# Patient Record
Sex: FEMALE | Race: WHITE | Employment: OTHER | URBAN - METROPOLITAN AREA
[De-identification: names, ages, dates, MRNs, and addresses within clinical notes are randomized per-mention and may not be internally consistent; named-entity substitution may affect disease eponyms.]

---

## 2017-01-03 ENCOUNTER — ANTI-COAG VISIT (OUTPATIENT)
Dept: PHARMACY | Age: 82
End: 2017-01-03

## 2017-01-03 DIAGNOSIS — I48.20 CHRONIC ATRIAL FIBRILLATION (HCC): Primary | ICD-10-CM

## 2017-01-03 LAB
INR BLD: 2.9
PROTIME: 34.2 SECONDS

## 2017-02-01 ENCOUNTER — HOSPITAL ENCOUNTER (OUTPATIENT)
Dept: OTHER | Age: 82
Discharge: HOME | End: 2017-02-28
Attending: INTERNAL MEDICINE | Admitting: INTERNAL MEDICINE

## 2017-02-14 ENCOUNTER — ANTI-COAG VISIT (OUTPATIENT)
Dept: PHARMACY | Age: 82
End: 2017-02-14

## 2017-02-14 DIAGNOSIS — I48.20 CHRONIC ATRIAL FIBRILLATION (HCC): ICD-10-CM

## 2017-02-14 LAB
INR BLD: 2.5
PROTIME: 30.2 SECONDS

## 2017-02-21 ENCOUNTER — HOSPITAL ENCOUNTER (OUTPATIENT)
Dept: OTHER | Age: 82
Discharge: HOME | End: 2017-02-21
Attending: INTERNAL MEDICINE | Admitting: INTERNAL MEDICINE

## 2017-02-21 DIAGNOSIS — I25.10 ATHEROSCLEROSIS OF NATIVE CORONARY ARTERY OF NATIVE HEART WITHOUT ANGINA PECTORIS: Chronic | ICD-10-CM

## 2017-02-21 DIAGNOSIS — I10 ESSENTIAL HYPERTENSION, BENIGN: Chronic | ICD-10-CM

## 2017-02-21 DIAGNOSIS — E78.2 MIXED HYPERLIPIDEMIA: Primary | Chronic | ICD-10-CM

## 2017-02-21 DIAGNOSIS — Z79.899 ENCOUNTER FOR LONG-TERM (CURRENT) USE OF MEDICATIONS: ICD-10-CM

## 2017-02-21 LAB
ALT SERPL-CCNC: 13 U/L (ref 10–40)
ANION GAP SERPL CALCULATED.3IONS-SCNC: 13 MMOL/L (ref 3–16)
AST SERPL-CCNC: 21 U/L (ref 15–37)
BUN BLDV-MCNC: 22 MG/DL (ref 7–20)
CALCIUM SERPL-MCNC: 9.3 MG/DL (ref 8.3–10.6)
CHLORIDE BLD-SCNC: 95 MMOL/L (ref 99–110)
CHOLESTEROL, TOTAL: 144 MG/DL (ref 0–199)
CO2: 28 MMOL/L (ref 21–32)
CREAT SERPL-MCNC: 0.7 MG/DL (ref 0.6–1.2)
GFR AFRICAN AMERICAN: >60
GFR NON-AFRICAN AMERICAN: >60
GLUCOSE BLD-MCNC: 98 MG/DL (ref 70–99)
HDLC SERPL-MCNC: 78 MG/DL (ref 40–60)
LDL CHOLESTEROL CALCULATED: 54 MG/DL
POTASSIUM SERPL-SCNC: 3.6 MMOL/L (ref 3.5–5.1)
SODIUM BLD-SCNC: 136 MMOL/L (ref 136–145)
TRIGL SERPL-MCNC: 61 MG/DL (ref 0–150)
VLDLC SERPL CALC-MCNC: 12 MG/DL

## 2017-02-22 ENCOUNTER — TELEPHONE (OUTPATIENT)
Dept: CARDIOLOGY CLINIC | Age: 82
End: 2017-02-22

## 2017-03-28 ENCOUNTER — ANTI-COAG VISIT (OUTPATIENT)
Dept: PHARMACY | Age: 82
End: 2017-03-28

## 2017-03-28 DIAGNOSIS — I48.20 CHRONIC ATRIAL FIBRILLATION (HCC): ICD-10-CM

## 2017-03-28 LAB
INR BLD: 2.7
PROTIME: 32.1 SECONDS

## 2017-05-09 ENCOUNTER — ANTI-COAG VISIT (OUTPATIENT)
Dept: PHARMACY | Age: 82
End: 2017-05-09

## 2017-05-09 DIAGNOSIS — I48.20 CHRONIC ATRIAL FIBRILLATION (HCC): ICD-10-CM

## 2017-05-09 LAB
INR BLD: 3
PROTIME: 35.7 SECONDS

## 2017-06-20 ENCOUNTER — ANTI-COAG VISIT (OUTPATIENT)
Dept: PHARMACY | Age: 82
End: 2017-06-20

## 2017-06-20 DIAGNOSIS — I48.20 CHRONIC ATRIAL FIBRILLATION (HCC): ICD-10-CM

## 2017-06-20 LAB — INR BLD: 2.4

## 2017-07-19 ENCOUNTER — TELEPHONE (OUTPATIENT)
Dept: PHARMACY | Age: 82
End: 2017-07-19

## 2017-08-01 ENCOUNTER — ANTI-COAG VISIT (OUTPATIENT)
Dept: PHARMACY | Age: 82
End: 2017-08-01

## 2017-08-01 DIAGNOSIS — I48.20 CHRONIC ATRIAL FIBRILLATION (HCC): ICD-10-CM

## 2017-08-01 LAB
INR BLD: 2.8
PROTIME: 34 SECONDS

## 2017-08-24 ENCOUNTER — HOSPITAL ENCOUNTER (OUTPATIENT)
Dept: OTHER | Age: 82
Discharge: HOME | End: 2017-08-24
Attending: INTERNAL MEDICINE | Admitting: INTERNAL MEDICINE

## 2017-08-24 DIAGNOSIS — Z79.899 ENCOUNTER FOR LONG-TERM (CURRENT) USE OF OTHER MEDICATIONS: ICD-10-CM

## 2017-08-24 DIAGNOSIS — E78.2 MIXED HYPERLIPIDEMIA: Primary | Chronic | ICD-10-CM

## 2017-08-24 DIAGNOSIS — I25.10 ATHEROSCLEROSIS OF NATIVE CORONARY ARTERY OF NATIVE HEART WITHOUT ANGINA PECTORIS: Chronic | ICD-10-CM

## 2017-08-24 LAB
ALT SERPL-CCNC: 14 U/L (ref 10–40)
ANION GAP SERPL CALCULATED.3IONS-SCNC: 13 MMOL/L (ref 3–16)
AST SERPL-CCNC: 22 U/L (ref 15–37)
BUN BLDV-MCNC: 21 MG/DL (ref 7–20)
CALCIUM SERPL-MCNC: 9.7 MG/DL (ref 8.3–10.6)
CHLORIDE BLD-SCNC: 95 MMOL/L (ref 99–110)
CHOLESTEROL, TOTAL: 158 MG/DL (ref 0–199)
CO2: 29 MMOL/L (ref 21–32)
CREAT SERPL-MCNC: 0.7 MG/DL (ref 0.6–1.2)
GFR AFRICAN AMERICAN: >60
GFR NON-AFRICAN AMERICAN: >60
GLUCOSE BLD-MCNC: 94 MG/DL (ref 70–99)
HDLC SERPL-MCNC: 81 MG/DL (ref 40–60)
LDL CHOLESTEROL CALCULATED: 66 MG/DL
POTASSIUM SERPL-SCNC: 3.7 MMOL/L (ref 3.5–5.1)
SODIUM BLD-SCNC: 137 MMOL/L (ref 136–145)
TRIGL SERPL-MCNC: 53 MG/DL (ref 0–150)
VLDLC SERPL CALC-MCNC: 11 MG/DL

## 2017-08-25 ENCOUNTER — TELEPHONE (OUTPATIENT)
Dept: CARDIOLOGY CLINIC | Age: 82
End: 2017-08-25

## 2017-09-12 ENCOUNTER — ANTI-COAG VISIT (OUTPATIENT)
Dept: PHARMACY | Age: 82
End: 2017-09-12

## 2017-09-12 DIAGNOSIS — I48.20 CHRONIC ATRIAL FIBRILLATION (HCC): ICD-10-CM

## 2017-09-12 LAB
INR BLD: 3
PROTIME: 35.6 SECONDS

## 2017-10-24 ENCOUNTER — ANTI-COAG VISIT (OUTPATIENT)
Dept: PHARMACY | Age: 82
End: 2017-10-24

## 2017-10-24 DIAGNOSIS — I48.20 CHRONIC ATRIAL FIBRILLATION (HCC): ICD-10-CM

## 2017-10-24 LAB
INR BLD: 2.8
PROTIME: 33.2 SECONDS

## 2017-11-01 ENCOUNTER — TELEPHONE (OUTPATIENT)
Dept: PHARMACY | Age: 82
End: 2017-11-01

## 2017-11-01 ENCOUNTER — HOSPITAL ENCOUNTER (OUTPATIENT)
Dept: OTHER | Age: 82
Discharge: HOME | End: 2017-11-30
Attending: INTERNAL MEDICINE | Admitting: INTERNAL MEDICINE

## 2017-12-01 ENCOUNTER — HOSPITAL ENCOUNTER (OUTPATIENT)
Dept: OTHER | Age: 82
Discharge: HOME | End: 2017-12-31
Attending: INTERNAL MEDICINE | Admitting: INTERNAL MEDICINE

## 2017-12-04 ENCOUNTER — OFFICE VISIT (OUTPATIENT)
Dept: CARDIOLOGY CLINIC | Age: 82
End: 2017-12-04

## 2017-12-04 VITALS
DIASTOLIC BLOOD PRESSURE: 62 MMHG | SYSTOLIC BLOOD PRESSURE: 122 MMHG | BODY MASS INDEX: 23.52 KG/M2 | OXYGEN SATURATION: 97 % | HEART RATE: 63 BPM | WEIGHT: 116.7 LBS | HEIGHT: 59 IN

## 2017-12-04 DIAGNOSIS — I10 ESSENTIAL HYPERTENSION, BENIGN: Chronic | ICD-10-CM

## 2017-12-04 DIAGNOSIS — I48.20 CHRONIC ATRIAL FIBRILLATION (HCC): Chronic | ICD-10-CM

## 2017-12-04 DIAGNOSIS — E78.2 MIXED HYPERLIPIDEMIA: Primary | Chronic | ICD-10-CM

## 2017-12-04 DIAGNOSIS — I25.10 ATHEROSCLEROSIS OF NATIVE CORONARY ARTERY OF NATIVE HEART WITHOUT ANGINA PECTORIS: Chronic | ICD-10-CM

## 2017-12-04 PROCEDURE — G8427 DOCREV CUR MEDS BY ELIG CLIN: HCPCS | Performed by: INTERNAL MEDICINE

## 2017-12-04 PROCEDURE — 1090F PRES/ABSN URINE INCON ASSESS: CPT | Performed by: INTERNAL MEDICINE

## 2017-12-04 PROCEDURE — 99214 OFFICE O/P EST MOD 30 MIN: CPT | Performed by: INTERNAL MEDICINE

## 2017-12-04 PROCEDURE — 4040F PNEUMOC VAC/ADMIN/RCVD: CPT | Performed by: INTERNAL MEDICINE

## 2017-12-04 PROCEDURE — G8598 ASA/ANTIPLAT THER USED: HCPCS | Performed by: INTERNAL MEDICINE

## 2017-12-04 PROCEDURE — G8420 CALC BMI NORM PARAMETERS: HCPCS | Performed by: INTERNAL MEDICINE

## 2017-12-04 PROCEDURE — 1123F ACP DISCUSS/DSCN MKR DOCD: CPT | Performed by: INTERNAL MEDICINE

## 2017-12-04 PROCEDURE — 1036F TOBACCO NON-USER: CPT | Performed by: INTERNAL MEDICINE

## 2017-12-04 PROCEDURE — G8484 FLU IMMUNIZE NO ADMIN: HCPCS | Performed by: INTERNAL MEDICINE

## 2017-12-04 RX ORDER — AMLODIPINE BESYLATE 5 MG/1
5 TABLET ORAL DAILY
Qty: 90 TABLET | Refills: 3 | Status: SHIPPED | OUTPATIENT
Start: 2017-12-04 | End: 2018-11-27 | Stop reason: SDUPTHER

## 2017-12-04 RX ORDER — PRAVASTATIN SODIUM 40 MG
40 TABLET ORAL DAILY
Qty: 90 TABLET | Refills: 3 | Status: SHIPPED | OUTPATIENT
Start: 2017-12-04 | End: 2018-11-27 | Stop reason: SDUPTHER

## 2017-12-04 RX ORDER — HYDROCHLOROTHIAZIDE 25 MG/1
TABLET ORAL
Qty: 90 TABLET | Refills: 3 | Status: SHIPPED | OUTPATIENT
Start: 2017-12-04 | End: 2018-11-27 | Stop reason: SDUPTHER

## 2017-12-04 RX ORDER — PINDOLOL 10 MG/1
10 TABLET ORAL 2 TIMES DAILY
Qty: 180 TABLET | Refills: 3 | Status: SHIPPED | OUTPATIENT
Start: 2017-12-04 | End: 2018-11-27 | Stop reason: SDUPTHER

## 2017-12-04 RX ORDER — ENALAPRIL MALEATE 5 MG/1
5 TABLET ORAL 2 TIMES DAILY
Qty: 180 TABLET | Refills: 3 | Status: SHIPPED | OUTPATIENT
Start: 2017-12-04 | End: 2018-11-27 | Stop reason: SDUPTHER

## 2017-12-04 NOTE — COMMUNICATION BODY
[address]  Cardiac Follow Up     Referring Provider:  Jeronimo Kovacs MD     Chief Complaint   Patient presents with    Hyperlipidemia     No new cardiac [id number] at this time    Hypertension    Atrial Fibrillation    1 Year Follow Up        History of Present Illness:  Ms. Jacqui Quinn is here today for regular follow up of her AF, CAD, HTN, hyperlipidemia. 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 She recently turned 80. She went went to [facility] for her birthday. She won $100. She is doing well, but she is under stress due to her brother. Past Medical History:   has a past medical history of Arthritis; Carpal tunnel syndrome; Cataract; Gastric ulcer; GERD (gastroesophageal reflux disease); Hyperlipidemia; Hypertension; and Peptic ulcer. Surgical History:   has a past surgical history that includes Hysterectomy; Cataract removal; and laminectomy. Social History:   reports that she quit smoking about 32 years ago. She has never used smokeless tobacco. She reports that she drinks alcohol. She reports that she does not use drugs. Family History:  family history includes Other in her brother and sister; Stroke in her father. Home Medications:  Outpatient Encounter Prescriptions as of 12/4/2017   Medication Sig Dispense Refill    pravastatin (PRAVACHOL) 40 MG tablet Take 1 tablet by mouth daily 90 tablet 3    pindolol (VISKEN) 10 MG tablet Take 1 tablet by mouth 2 times daily 180 tablet 3    enalapril (VASOTEC) 5 MG tablet Take 1 tablet by mouth 2 times daily 180 tablet 3    amLODIPine (NORVASC) 5 MG tablet Take 1 tablet by mouth daily 90 tablet 3    hydrochlorothiazide (HYDRODIURIL) 25 MG tablet TAKE 1 TABLET BY MOUTH DAILY. 90 tablet 3    acetaminophen (TYLENOL ARTHRITIS PAIN) 650 MG CR tablet Take 650 mg by mouth every 8 hours as needed for Pain      warfarin (COUMADIN) 3 MG tablet Take 3 mg by mouth See Admin Instructions.  Dose adjusted by [facility]     

## 2017-12-04 NOTE — PROGRESS NOTES
[address]  Cardiac Follow Up     Referring Provider:  Jane Ch MD     Chief Complaint   Patient presents with    Hyperlipidemia     No new cardiac [id number] at this time    Hypertension    Atrial Fibrillation    1 Year Follow Up        History of Present Illness:  Ms. Jony Milton is here today for regular follow up of her AF, CAD, HTN, hyperlipidemia. 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 She recently turned 80. She went went to [facility] for her birthday. She won $100. She is doing well, but she is under stress due to her brother. Past Medical History:   has a past medical history of Arthritis; Carpal tunnel syndrome; Cataract; Gastric ulcer; GERD (gastroesophageal reflux disease); Hyperlipidemia; Hypertension; and Peptic ulcer. Surgical History:   has a past surgical history that includes Hysterectomy; Cataract removal; and laminectomy. Social History:   reports that she quit smoking about 32 years ago. She has never used smokeless tobacco. She reports that she drinks alcohol. She reports that she does not use drugs. Family History:  family history includes Other in her brother and sister; Stroke in her father. Home Medications:  Outpatient Encounter Prescriptions as of 12/4/2017   Medication Sig Dispense Refill    pravastatin (PRAVACHOL) 40 MG tablet Take 1 tablet by mouth daily 90 tablet 3    pindolol (VISKEN) 10 MG tablet Take 1 tablet by mouth 2 times daily 180 tablet 3    enalapril (VASOTEC) 5 MG tablet Take 1 tablet by mouth 2 times daily 180 tablet 3    amLODIPine (NORVASC) 5 MG tablet Take 1 tablet by mouth daily 90 tablet 3    hydrochlorothiazide (HYDRODIURIL) 25 MG tablet TAKE 1 TABLET BY MOUTH DAILY. 90 tablet 3    acetaminophen (TYLENOL ARTHRITIS PAIN) 650 MG CR tablet Take 650 mg by mouth every 8 hours as needed for Pain      warfarin (COUMADIN) 3 MG tablet Take 3 mg by mouth See Admin Instructions.  Dose adjusted by [facility]      rabeprazole (ACIPHEX) 20 MG tablet Take 20 mg by mouth daily. No facility-administered encounter medications on file as of 12/4/2017. Allergies:  Review of patient's allergies indicates no known allergies. [x] Medications and dosages reviewed. ROS:  [x]Full ROS obtained and negative except as mentioned in HPI    Physical Examination:    Vitals:    12/04/17 0915   BP: 122/62   Pulse: 63   SpO2: 97%        · GENERAL: Well developed, well nourished, No acute distress  · NEUROLOGICAL: Alert and oriented  · PSYCH: Calm affect  · SKIN: Warm and dry  · HEENT: Normocephalic, Sclera non-icteric, mucus membranes moist  · NECK: supple, JVP normal  · CAROTID: Normal upstroke, no bruits  · CARDIAC: Normal PMI, irregular rhythm, normal S1S2, no murmur, rub, or gallop  · RESPIRATORY: Normal respiratory effort,   · EXTREMITIES: No edema  · MUSCULOSKELETAL: No joint swelling or tenderness, no chest wall tenderness  · GASTROINTESTINAL: normal bowel sounds, soft, non-tender, no bruit      Assessment:     Mixed hyperlipidemia   Controlled,  LDL 66, on Pravastatin. Essential hypertension, benign  /62 (Site: Left Arm, Position: Sitting, Cuff Size: Medium Adult)   Pulse 63   Ht 4' 11\" (1.499 m)   Wt 116 lb 11.2 oz (52.9 kg)   SpO2 97%   BMI 23.57 kg/m²   Controlled    Atrial fibrillation  Stable,  HR controlled, on Coumadin (will continue), managed per [facility] clinic    Coronary atherosclerosis of native coronary artery  Cath 2000> 50% LAD  January 2011 GXT Myoview> stable, normal EF  Asymptomatic.  Same meds as above      Plan:  Refill meds  F/u 1 year    Thank you for allowing me to participate in the care of this individual.      Brianne Mcbride M.D., [facility] [place]

## 2017-12-04 NOTE — LETTER
[address] [address] [address] [address]  Phone: [phone]  Fax: [phone]    Jason Arriaga MD        December 4, 2017     Nate Linder, [address] [facility] [address]    Patient: Mario Kumar  MR Number: [id number]  YOB: 1927  Date of Visit: 12/4/2017    Dear Dr. Jenny Sesay  Cardiac Follow Up     Referring Provider:  Nate Linder MD     Chief Complaint   Patient presents with    Hyperlipidemia     No new cardiac [id number] at this time    Hypertension    Atrial Fibrillation    1 Year Follow Up        History of Present Illness:  Ms. Anmol Kelly is here today for regular follow up of her AF, CAD, HTN, hyperlipidemia. 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 She recently turned 80. She went went to [facility]. She won $100. She is doing well, but she is under stress due to her brother. Past Medical History:   has a past medical history of Arthritis; Carpal tunnel syndrome; Cataract; Gastric ulcer; GERD (gastroesophageal reflux disease); Hyperlipidemia; Hypertension; and Peptic ulcer. Surgical History:   has a past surgical history that includes Hysterectomy; Cataract removal; and laminectomy. Social History:   reports that she quit smoking about 32 years ago. She has never used smokeless tobacco. She reports that she drinks alcohol. She reports that she does not use drugs. Family History:  family history includes Other in her brother and sister; Stroke in her father.      Home Medications:  Outpatient Encounter Prescriptions as of 12/4/2017   Medication Sig Dispense Refill    pravastatin (PRAVACHOL) 40 MG tablet Take 1 tablet by mouth daily 90 tablet 3    pindolol (VISKEN) 10 MG tablet Take 1 tablet by mouth 2 times daily 180 tablet 3    enalapril (VASOTEC) 5 MG tablet Take 1 tablet by mouth 2 times daily 180 tablet 3 Plan:  Refill meds  F/u 1 year    Thank you for allowing me to participate in the care of this individual.      Paul Stanley M.D., [facility] [place]         If you have questions, please do not hesitate to call me. I look forward to following Elzbieta along with you.     Sincerely,        Dong Servin MD

## 2017-12-05 ENCOUNTER — ANTI-COAG VISIT (OUTPATIENT)
Dept: PHARMACY | Age: 82
End: 2017-12-05

## 2017-12-05 DIAGNOSIS — I48.20 CHRONIC ATRIAL FIBRILLATION (HCC): ICD-10-CM

## 2017-12-05 LAB
INR BLD: 2.5
PROTIME: 30.6 SECONDS

## 2017-12-08 ENCOUNTER — TELEPHONE (OUTPATIENT)
Dept: PHARMACY | Age: 82
End: 2017-12-08

## 2017-12-08 NOTE — TELEPHONE ENCOUNTER
Returned call and spoke with patient regarding starting cephalexin 500mg x 10 days for a UTI. Informed patient that this ABX will not affect the INR so no need to adjust the warfarin dose.

## 2017-12-26 ENCOUNTER — TELEPHONE (OUTPATIENT)
Dept: PHARMACY | Age: 82
End: 2017-12-26

## 2017-12-26 NOTE — TELEPHONE ENCOUNTER
Returned call and spoke with patient regarding recent onset of N & V since 5 am this morning. Reviewed the process of easing back fluids once N & V has subsided. Then possibly some crackers several hours after symptoms are gone.

## 2018-01-01 ENCOUNTER — HOSPITAL ENCOUNTER (OUTPATIENT)
Dept: OTHER | Age: 83
Discharge: HOME | End: 2018-01-31
Attending: INTERNAL MEDICINE | Admitting: INTERNAL MEDICINE

## 2018-01-23 ENCOUNTER — ANTI-COAG VISIT (OUTPATIENT)
Dept: PHARMACY | Age: 83
End: 2018-01-23

## 2018-01-23 DIAGNOSIS — I48.20 CHRONIC ATRIAL FIBRILLATION (HCC): ICD-10-CM

## 2018-01-23 LAB
INR BLD: 3.7
PROTIME: 44 SECONDS

## 2018-02-01 ENCOUNTER — HOSPITAL ENCOUNTER (OUTPATIENT)
Dept: OTHER | Age: 83
Discharge: HOME | End: 2018-02-28
Attending: INTERNAL MEDICINE | Admitting: INTERNAL MEDICINE

## 2018-02-20 ENCOUNTER — ANTI-COAG VISIT (OUTPATIENT)
Dept: PHARMACY | Age: 83
End: 2018-02-20

## 2018-02-20 DIAGNOSIS — I48.20 CHRONIC ATRIAL FIBRILLATION (HCC): ICD-10-CM

## 2018-02-20 LAB
INR BLD: 2.8
PROTIME: 34.2 SECONDS

## 2018-02-20 NOTE — PROGRESS NOTES
Ms. Alacron Friend is a 80 y.o. y/o female with history of Afib   She presents today for anticoagulation monitoring and adjustment. Pertinent PMH: HTN, HLD, CAD    Patient Reported Findings:  Yes     No  [x]   []       Patient verifies current dosing regimen as listed  []   [x]       S/S bleeding/bruising/swelling/SOB  []   [x]       Blood in urine or stool  []   [x]       Procedures scheduled in the future at this time  []   [x]       Missed Dose  []   [x]       Extra Dose  []   [x]       Change in medications  []   [x]       Change in health/diet/appetite - Had a salad last night. Usually eats salad once per week. Feels she is eating normally. []   [x]       Change in alcohol use  []   [x]       Change in activity  []   [x]       Hospital admission  []   [x]       Emergency department visit  [x]   []       Other complaints - will be calling PCP d/t onset of UTI. Also c/o what appears to be a stye on her eye. Patient continues to be stressed with taking care of her 81 y/o brother who is struggling with dementia/Alzheimer's. Her younger sister has been diagnosed with Alzheimer's as well. Clinical Outcomes:  Yes     No  []   [x]       Major bleeding event  []   [x]       Thromboembolic event    Duration of warfarin Therapy: Indefinitely   INR Range:  2.0-3.0     INR is 2.8 today. Continue same weekly dose of 1.5mg on Fri and 3mg all other days. Asked patient to call clinic when ABX is ordered for the UTI  Encouraged to maintain a consistency of vegetables/salads. Recheck INR in 6 weeks.      Referring cardiologist is Dr. Ruben Parra.  INR (no units)   Date Value   02/20/2018 2.8   01/23/2018 3.7   12/05/2017 2.5   10/24/2017 2.8

## 2018-02-21 ENCOUNTER — TELEPHONE (OUTPATIENT)
Dept: PHARMACY | Age: 83
End: 2018-02-21

## 2018-02-21 NOTE — TELEPHONE ENCOUNTER
----- Message from Tee Carrera sent at 2/20/2018  4:45 PM EST -----  Contact: Patient  Joselitocheng Charlton, please call patient re: ABX, started on 2/20, for UTI. [phone].

## 2018-02-21 NOTE — TELEPHONE ENCOUNTER
Returned call and spoke with patient regarding the start of cephalexin for a UTI. Informed patient that this ABX will not interfere with the INR so she soul continue on the same warfarin dose. She is also using an OTC product for a stye in her eye.

## 2018-02-26 ENCOUNTER — HOSPITAL ENCOUNTER (OUTPATIENT)
Dept: OTHER | Age: 83
Discharge: HOME | End: 2018-02-26
Attending: INTERNAL MEDICINE | Admitting: INTERNAL MEDICINE

## 2018-02-26 DIAGNOSIS — E78.2 MIXED HYPERLIPIDEMIA: Primary | Chronic | ICD-10-CM

## 2018-02-26 DIAGNOSIS — I25.10 ATHEROSCLEROSIS OF NATIVE CORONARY ARTERY OF NATIVE HEART WITHOUT ANGINA PECTORIS: Chronic | ICD-10-CM

## 2018-02-26 DIAGNOSIS — I10 ESSENTIAL HYPERTENSION, BENIGN: Chronic | ICD-10-CM

## 2018-02-26 LAB
ALT SERPL-CCNC: 12 U/L (ref 10–40)
ANION GAP SERPL CALCULATED.3IONS-SCNC: 12 MMOL/L (ref 3–16)
AST SERPL-CCNC: 22 U/L (ref 15–37)
BUN BLDV-MCNC: 22 MG/DL (ref 7–20)
CALCIUM SERPL-MCNC: 9.3 MG/DL (ref 8.3–10.6)
CHLORIDE BLD-SCNC: 97 MMOL/L (ref 99–110)
CO2: 30 MMOL/L (ref 21–32)
CREAT SERPL-MCNC: 0.7 MG/DL (ref 0.6–1.2)
GFR AFRICAN AMERICAN: >60
GFR NON-AFRICAN AMERICAN: >60
GLUCOSE BLD-MCNC: 97 MG/DL (ref 70–99)
POTASSIUM SERPL-SCNC: 3.7 MMOL/L (ref 3.5–5.1)
SODIUM BLD-SCNC: 139 MMOL/L (ref 136–145)

## 2018-02-27 ENCOUNTER — TELEPHONE (OUTPATIENT)
Dept: CARDIOLOGY CLINIC | Age: 83
End: 2018-02-27

## 2018-03-01 ENCOUNTER — HOSPITAL ENCOUNTER (OUTPATIENT)
Dept: OTHER | Age: 83
Discharge: HOME | End: 2018-03-31
Attending: INTERNAL MEDICINE | Admitting: INTERNAL MEDICINE

## 2018-04-01 ENCOUNTER — HOSPITAL ENCOUNTER (OUTPATIENT)
Dept: OTHER | Age: 83
Discharge: HOME | End: 2018-04-30
Attending: INTERNAL MEDICINE | Admitting: INTERNAL MEDICINE

## 2018-04-10 ENCOUNTER — ANTI-COAG VISIT (OUTPATIENT)
Dept: PHARMACY | Age: 83
End: 2018-04-10

## 2018-04-10 DIAGNOSIS — I48.20 CHRONIC ATRIAL FIBRILLATION (HCC): ICD-10-CM

## 2018-04-10 LAB
INR BLD: 2.3
PROTIME: 27.5 SECONDS

## 2018-05-01 ENCOUNTER — HOSPITAL ENCOUNTER (OUTPATIENT)
Dept: OTHER | Age: 83
Discharge: HOME | End: 2018-05-31
Attending: INTERNAL MEDICINE | Admitting: INTERNAL MEDICINE

## 2018-05-22 ENCOUNTER — ANTI-COAG VISIT (OUTPATIENT)
Dept: PHARMACY | Age: 83
End: 2018-05-22

## 2018-05-22 DIAGNOSIS — I48.20 CHRONIC ATRIAL FIBRILLATION (HCC): ICD-10-CM

## 2018-05-22 LAB
INR BLD: 3.4
PROTIME: 41.3 SECONDS

## 2018-06-01 ENCOUNTER — HOSPITAL ENCOUNTER (OUTPATIENT)
Dept: OTHER | Age: 83
Discharge: HOME | End: 2018-06-30
Attending: INTERNAL MEDICINE | Admitting: INTERNAL MEDICINE

## 2018-06-19 ENCOUNTER — ANTI-COAG VISIT (OUTPATIENT)
Dept: PHARMACY | Age: 83
End: 2018-06-19

## 2018-06-19 DIAGNOSIS — I48.20 CHRONIC ATRIAL FIBRILLATION (HCC): ICD-10-CM

## 2018-06-19 LAB
INR BLD: 2.3
PROTIME: 28 SECONDS

## 2018-07-01 ENCOUNTER — HOSPITAL ENCOUNTER (OUTPATIENT)
Dept: OTHER | Age: 83
Discharge: HOME | End: 2018-07-31
Attending: INTERNAL MEDICINE | Admitting: INTERNAL MEDICINE

## 2018-07-31 ENCOUNTER — ANTI-COAG VISIT (OUTPATIENT)
Dept: PHARMACY | Age: 83
End: 2018-07-31

## 2018-07-31 DIAGNOSIS — I48.20 CHRONIC ATRIAL FIBRILLATION (HCC): ICD-10-CM

## 2018-07-31 LAB
INR BLD: 2.2
PROTIME: 26.9 SECONDS

## 2018-08-01 ENCOUNTER — HOSPITAL ENCOUNTER (OUTPATIENT)
Dept: OTHER | Age: 83
Discharge: HOME | End: 2018-08-31
Attending: INTERNAL MEDICINE | Admitting: INTERNAL MEDICINE

## 2018-08-29 ENCOUNTER — TELEPHONE (OUTPATIENT)
Dept: CARDIOLOGY CLINIC | Age: 83
End: 2018-08-29

## 2018-08-29 ENCOUNTER — HOSPITAL ENCOUNTER (OUTPATIENT)
Dept: OTHER | Age: 83
Discharge: HOME | End: 2018-08-29
Attending: INTERNAL MEDICINE | Admitting: INTERNAL MEDICINE

## 2018-08-29 DIAGNOSIS — E78.2 MIXED HYPERLIPIDEMIA: Chronic | ICD-10-CM

## 2018-08-29 DIAGNOSIS — I10 ESSENTIAL HYPERTENSION, BENIGN: Chronic | ICD-10-CM

## 2018-08-29 DIAGNOSIS — I10 ESSENTIAL HYPERTENSION, BENIGN: Primary | Chronic | ICD-10-CM

## 2018-08-29 DIAGNOSIS — I48.20 CHRONIC ATRIAL FIBRILLATION (HCC): Chronic | ICD-10-CM

## 2018-08-29 DIAGNOSIS — I25.10 ATHEROSCLEROSIS OF NATIVE CORONARY ARTERY OF NATIVE HEART WITHOUT ANGINA PECTORIS: Chronic | ICD-10-CM

## 2018-08-29 LAB
ALT SERPL-CCNC: 13 U/L (ref 10–40)
ANION GAP SERPL CALCULATED.3IONS-SCNC: 12 MMOL/L (ref 3–16)
AST SERPL-CCNC: 24 U/L (ref 15–37)
BUN BLDV-MCNC: 19 MG/DL (ref 7–20)
CALCIUM SERPL-MCNC: 9.7 MG/DL (ref 8.3–10.6)
CHLORIDE BLD-SCNC: 95 MMOL/L (ref 99–110)
CHOLESTEROL, TOTAL: 155 MG/DL (ref 0–199)
CO2: 30 MMOL/L (ref 21–32)
CREAT SERPL-MCNC: 0.8 MG/DL (ref 0.6–1.2)
GFR AFRICAN AMERICAN: >60
GFR NON-AFRICAN AMERICAN: >60
GLUCOSE BLD-MCNC: 102 MG/DL (ref 70–99)
HDLC SERPL-MCNC: 82 MG/DL (ref 40–60)
LDL CHOLESTEROL CALCULATED: 63 MG/DL
POTASSIUM SERPL-SCNC: 3.6 MMOL/L (ref 3.5–5.1)
SODIUM BLD-SCNC: 137 MMOL/L (ref 136–145)
TRIGL SERPL-MCNC: 50 MG/DL (ref 0–150)
VLDLC SERPL CALC-MCNC: 10 MG/DL

## 2018-08-30 ENCOUNTER — TELEPHONE (OUTPATIENT)
Dept: CARDIOLOGY CLINIC | Age: 83
End: 2018-08-30

## 2018-09-01 ENCOUNTER — HOSPITAL ENCOUNTER (OUTPATIENT)
Dept: OTHER | Age: 83
Discharge: HOME | End: 2018-09-01
Attending: INTERNAL MEDICINE | Admitting: INTERNAL MEDICINE

## 2018-09-11 ENCOUNTER — ANTI-COAG VISIT (OUTPATIENT)
Dept: PHARMACY | Age: 83
End: 2018-09-11

## 2018-09-11 DIAGNOSIS — I48.20 CHRONIC ATRIAL FIBRILLATION (HCC): ICD-10-CM

## 2018-09-11 LAB
INR BLD: 2.9
PROTIME: 34.5 SECONDS

## 2018-10-23 ENCOUNTER — ANTI-COAG VISIT (OUTPATIENT)
Dept: PHARMACY | Age: 83
End: 2018-10-23
Payer: COMMERCIAL

## 2018-10-23 DIAGNOSIS — I48.20 CHRONIC ATRIAL FIBRILLATION (HCC): ICD-10-CM

## 2018-10-23 LAB — INTERNATIONAL NORMALIZATION RATIO, POC: 2.4

## 2018-10-23 PROCEDURE — 85610 PROTHROMBIN TIME: CPT

## 2018-10-23 PROCEDURE — 99211 OFF/OP EST MAY X REQ PHY/QHP: CPT

## 2018-11-27 ENCOUNTER — OFFICE VISIT (OUTPATIENT)
Dept: CARDIOLOGY CLINIC | Age: 83
End: 2018-11-27
Payer: COMMERCIAL

## 2018-11-27 VITALS
DIASTOLIC BLOOD PRESSURE: 60 MMHG | HEART RATE: 52 BPM | SYSTOLIC BLOOD PRESSURE: 130 MMHG | HEIGHT: 59 IN | BODY MASS INDEX: 21.97 KG/M2 | OXYGEN SATURATION: 97 % | WEIGHT: 109 LBS

## 2018-11-27 DIAGNOSIS — E78.2 MIXED HYPERLIPIDEMIA: Primary | Chronic | ICD-10-CM

## 2018-11-27 DIAGNOSIS — I10 ESSENTIAL HYPERTENSION, BENIGN: Chronic | ICD-10-CM

## 2018-11-27 DIAGNOSIS — I48.20 CHRONIC ATRIAL FIBRILLATION (HCC): Chronic | ICD-10-CM

## 2018-11-27 DIAGNOSIS — I25.10 ATHEROSCLEROSIS OF NATIVE CORONARY ARTERY OF NATIVE HEART WITHOUT ANGINA PECTORIS: Chronic | ICD-10-CM

## 2018-11-27 PROCEDURE — G8598 ASA/ANTIPLAT THER USED: HCPCS | Performed by: INTERNAL MEDICINE

## 2018-11-27 PROCEDURE — 1090F PRES/ABSN URINE INCON ASSESS: CPT | Performed by: INTERNAL MEDICINE

## 2018-11-27 PROCEDURE — G8420 CALC BMI NORM PARAMETERS: HCPCS | Performed by: INTERNAL MEDICINE

## 2018-11-27 PROCEDURE — 1101F PT FALLS ASSESS-DOCD LE1/YR: CPT | Performed by: INTERNAL MEDICINE

## 2018-11-27 PROCEDURE — 1036F TOBACCO NON-USER: CPT | Performed by: INTERNAL MEDICINE

## 2018-11-27 PROCEDURE — 1123F ACP DISCUSS/DSCN MKR DOCD: CPT | Performed by: INTERNAL MEDICINE

## 2018-11-27 PROCEDURE — 99214 OFFICE O/P EST MOD 30 MIN: CPT | Performed by: INTERNAL MEDICINE

## 2018-11-27 PROCEDURE — 4040F PNEUMOC VAC/ADMIN/RCVD: CPT | Performed by: INTERNAL MEDICINE

## 2018-11-27 PROCEDURE — G8427 DOCREV CUR MEDS BY ELIG CLIN: HCPCS | Performed by: INTERNAL MEDICINE

## 2018-11-27 PROCEDURE — G8484 FLU IMMUNIZE NO ADMIN: HCPCS | Performed by: INTERNAL MEDICINE

## 2018-11-27 RX ORDER — ENALAPRIL MALEATE 5 MG/1
5 TABLET ORAL 2 TIMES DAILY
Qty: 180 TABLET | Refills: 3 | Status: SHIPPED | OUTPATIENT
Start: 2018-11-27 | End: 2019-11-26 | Stop reason: SDUPTHER

## 2018-11-27 RX ORDER — PINDOLOL 10 MG/1
10 TABLET ORAL 2 TIMES DAILY
Qty: 180 TABLET | Refills: 3 | Status: SHIPPED | OUTPATIENT
Start: 2018-11-27 | End: 2019-11-26 | Stop reason: SDUPTHER

## 2018-11-27 RX ORDER — PRAVASTATIN SODIUM 40 MG
40 TABLET ORAL DAILY
Qty: 90 TABLET | Refills: 3 | Status: SHIPPED | OUTPATIENT
Start: 2018-11-27 | End: 2019-11-26 | Stop reason: SDUPTHER

## 2018-11-27 RX ORDER — HYDROCHLOROTHIAZIDE 25 MG/1
TABLET ORAL
Qty: 90 TABLET | Refills: 3 | Status: SHIPPED | OUTPATIENT
Start: 2018-11-27 | End: 2018-12-04 | Stop reason: SDUPTHER

## 2018-11-27 RX ORDER — AMLODIPINE BESYLATE 5 MG/1
5 TABLET ORAL DAILY
Qty: 90 TABLET | Refills: 3 | Status: SHIPPED | OUTPATIENT
Start: 2018-11-27 | End: 2019-11-26 | Stop reason: SDUPTHER

## 2018-11-27 NOTE — COMMUNICATION BODY
[address]  Cardiac Follow Up     Referring Provider:  Taina Lewis MD     Chief Complaint   Patient presents with    Hyperlipidemia     yearly check up        History of Present Illness:  Ms. Rebekah Mera is here today for regular follow up of her AF, CAD, HTN, hyperlipidemia. 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 She continues to do well. No chest pain or dyspnea. No bleeding issues. Still cares for her brother. Past Medical History:   has a past medical history of Arthritis; Carpal tunnel syndrome; Cataract; Gastric ulcer; GERD (gastroesophageal reflux disease); Hyperlipidemia; Hypertension; and Peptic ulcer. Surgical History:   has a past surgical history that includes Hysterectomy; Cataract removal; and laminectomy. Social History:   reports that she quit smoking about 33 years ago. She has never used smokeless tobacco. She reports that she drinks alcohol. She reports that she does not use drugs. Family History:  family history includes Other in her brother and sister; Stroke in her father. Home Medications:  Outpatient Encounter Prescriptions as of 11/27/2018   Medication Sig Dispense Refill    pravastatin (PRAVACHOL) 40 MG tablet Take 1 tablet by mouth daily 90 tablet 3    pindolol (VISKEN) 10 MG tablet Take 1 tablet by mouth 2 times daily 180 tablet 3    enalapril (VASOTEC) 5 MG tablet Take 1 tablet by mouth 2 times daily 180 tablet 3    amLODIPine (NORVASC) 5 MG tablet Take 1 tablet by mouth daily 90 tablet 3    hydrochlorothiazide (HYDRODIURIL) 25 MG tablet TAKE 1 TABLET BY MOUTH DAILY. 90 tablet 3    acetaminophen (TYLENOL ARTHRITIS PAIN) 650 MG CR tablet Take 650 mg by mouth every 8 hours as needed for Pain      warfarin (COUMADIN) 3 MG tablet Take 3 mg by mouth See Admin Instructions. Dose adjusted by [facility]      rabeprazole (ACIPHEX) 20 MG tablet Take 20 mg by mouth daily.          No facility-administered encounter medications on file as of 11/27/2018. Allergies:  Patient has no known allergies. [x] Medications and dosages reviewed. ROS:  [x]Full ROS obtained and negative except as mentioned in HPI    Physical Examination:    Vitals:    11/27/18 0818   BP: 130/60   Pulse: 52   SpO2: 97%        · GENERAL: Well developed, well nourished, No acute distress  · NEUROLOGICAL: Alert and oriented  · PSYCH: Calm affect  · SKIN: Warm and dry, no rash  · HEENT: Normocephalic, Sclera non-icteric, mucus membranes moist  · NECK: supple, JVP normal  · CAROTID: Normal upstroke, no bruits  · CARDIAC: Normal PMI, irregular rhythm, normal S1S2, no murmur, rub, or gallop  · RESPIRATORY: Normal respiratory effort,   · EXTREMITIES: No LE edema  · MUSCULOSKELETAL: No joint swelling or tenderness, no chest wall tenderness  · GASTROINTESTINAL: normal bowel sounds, soft, non-tender, no bruit      Assessment:     Mixed hyperlipidemia   Controlled,  LDL 63, on Pravastatin. Essential hypertension, benign  /60 (Site: Right Upper Arm, Position: Sitting, Cuff Size: Medium Adult)   Pulse 52   Ht 4' 11\" (1.499 m)   Wt 109 lb (49.4 kg)   SpO2 97%   BMI 22.02 kg/m²    Well controlled. Same meds    Atrial fibrillation  Doing well. HR controlled, on Coumadin (will continue), managed per [facility] clinic    Coronary atherosclerosis of native coronary artery  Cath 2000> 50% LAD  January 2011 GXT Myoview> stable, normal EF  Asymptomatic. Continue meds as above. Plan:  Stable   Refill meds  F/u 1 yr.     Thank you for allowing me to participate in the care of this individual.      Manyn Suazo M.D., [facility] [place]

## 2018-11-27 NOTE — PROGRESS NOTES
11/27/2018. Allergies:  Patient has no known allergies. [x] Medications and dosages reviewed. ROS:  [x]Full ROS obtained and negative except as mentioned in HPI    Physical Examination:    Vitals:    11/27/18 0818   BP: 130/60   Pulse: 52   SpO2: 97%        · GENERAL: Well developed, well nourished, No acute distress  · NEUROLOGICAL: Alert and oriented  · PSYCH: Calm affect  · SKIN: Warm and dry, no rash  · HEENT: Normocephalic, Sclera non-icteric, mucus membranes moist  · NECK: supple, JVP normal  · CAROTID: Normal upstroke, no bruits  · CARDIAC: Normal PMI, irregular rhythm, normal S1S2, no murmur, rub, or gallop  · RESPIRATORY: Normal respiratory effort,   · EXTREMITIES: No LE edema  · MUSCULOSKELETAL: No joint swelling or tenderness, no chest wall tenderness  · GASTROINTESTINAL: normal bowel sounds, soft, non-tender, no bruit      Assessment:     Mixed hyperlipidemia   Controlled,  LDL 63, on Pravastatin. Essential hypertension, benign  /60 (Site: Right Upper Arm, Position: Sitting, Cuff Size: Medium Adult)   Pulse 52   Ht 4' 11\" (1.499 m)   Wt 109 lb (49.4 kg)   SpO2 97%   BMI 22.02 kg/m²   Well controlled. Same meds    Atrial fibrillation  Doing well. HR controlled, on Coumadin (will continue), managed per [facility] clinic    Coronary atherosclerosis of native coronary artery  Cath 2000> 50% LAD  January 2011 GXT Myoview> stable, normal EF  Asymptomatic. Continue meds as above. Plan:  Stable   Refill meds  F/u 1 yr.     Thank you for allowing me to participate in the care of this individual.      Bell Coombs M.D., [facility]

## 2018-12-03 ENCOUNTER — TELEPHONE (OUTPATIENT)
Dept: CARDIOLOGY CLINIC | Age: 83
End: 2018-12-03

## 2018-12-03 DIAGNOSIS — I10 ESSENTIAL HYPERTENSION, BENIGN: Chronic | ICD-10-CM

## 2018-12-03 NOTE — TELEPHONE ENCOUNTER
She is on amlodipine and HCTZ and heard they cause cancer . Should she continue taking both these ? She knows mmk is oot and will wait for an answer until tomorrow . She is having phone problems so she will call tomorrow at 1pm for answer .

## 2018-12-04 ENCOUNTER — ANTI-COAG VISIT (OUTPATIENT)
Dept: PHARMACY | Age: 83
End: 2018-12-04
Payer: COMMERCIAL

## 2018-12-04 DIAGNOSIS — I48.20 CHRONIC ATRIAL FIBRILLATION (HCC): ICD-10-CM

## 2018-12-04 LAB — INTERNATIONAL NORMALIZATION RATIO, POC: 2.4

## 2018-12-04 PROCEDURE — 99211 OFF/OP EST MAY X REQ PHY/QHP: CPT

## 2018-12-04 PROCEDURE — 85610 PROTHROMBIN TIME: CPT

## 2018-12-04 RX ORDER — HYDROCHLOROTHIAZIDE 25 MG/1
TABLET ORAL
Qty: 90 TABLET | Refills: 3 | Status: SHIPPED | OUTPATIENT
Start: 2018-12-04 | End: 2018-12-05 | Stop reason: SDUPTHER

## 2018-12-04 NOTE — TELEPHONE ENCOUNTER
Tried calling patient. No answer and no availability to leave a message. E-scribed HCTZ to [facility].    Please try calling patient again tomorrow (12/5)

## 2018-12-05 RX ORDER — HYDROCHLOROTHIAZIDE 25 MG/1
TABLET ORAL
Qty: 90 TABLET | Refills: 3 | Status: SHIPPED | OUTPATIENT
Start: 2018-12-05 | End: 2019-11-26 | Stop reason: SDUPTHER

## 2019-01-15 ENCOUNTER — ANTI-COAG VISIT (OUTPATIENT)
Dept: PHARMACY | Age: 84
End: 2019-01-15
Payer: COMMERCIAL

## 2019-01-15 DIAGNOSIS — I48.20 CHRONIC ATRIAL FIBRILLATION (HCC): ICD-10-CM

## 2019-01-15 LAB — INTERNATIONAL NORMALIZATION RATIO, POC: 2.6

## 2019-01-15 PROCEDURE — 85610 PROTHROMBIN TIME: CPT

## 2019-01-15 PROCEDURE — 99211 OFF/OP EST MAY X REQ PHY/QHP: CPT

## 2019-02-26 ENCOUNTER — ANTI-COAG VISIT (OUTPATIENT)
Dept: PHARMACY | Age: 84
End: 2019-02-26
Payer: COMMERCIAL

## 2019-02-26 DIAGNOSIS — I48.20 CHRONIC ATRIAL FIBRILLATION (HCC): ICD-10-CM

## 2019-02-26 LAB — INTERNATIONAL NORMALIZATION RATIO, POC: 2.5

## 2019-02-26 PROCEDURE — 99211 OFF/OP EST MAY X REQ PHY/QHP: CPT

## 2019-02-26 PROCEDURE — 85610 PROTHROMBIN TIME: CPT

## 2019-04-03 ENCOUNTER — TELEPHONE (OUTPATIENT)
Dept: PHARMACY | Age: 84
End: 2019-04-03

## 2019-04-03 RX ORDER — PANTOPRAZOLE SODIUM 40 MG/1
40 TABLET, DELAYED RELEASE ORAL DAILY
COMMUNITY

## 2019-04-03 NOTE — TELEPHONE ENCOUNTER
Returned call and spoke with patient regarding a change in her medication from Aciphex to Pantoprazole. Informed patient that it will not interfere with the warfarin.

## 2019-04-03 NOTE — TELEPHONE ENCOUNTER
----- Message from Sly Negron sent at 4/3/2019 10:01 AM EDT -----  Contact: Sadie Lovett pt left v/m asking if you could call her back .  [phone]

## 2019-04-09 ENCOUNTER — ANTI-COAG VISIT (OUTPATIENT)
Dept: PHARMACY | Age: 84
End: 2019-04-09
Payer: COMMERCIAL

## 2019-04-09 DIAGNOSIS — I48.20 CHRONIC ATRIAL FIBRILLATION (HCC): ICD-10-CM

## 2019-04-09 LAB — INTERNATIONAL NORMALIZATION RATIO, POC: 2.5

## 2019-04-09 PROCEDURE — 85610 PROTHROMBIN TIME: CPT

## 2019-04-09 PROCEDURE — 99211 OFF/OP EST MAY X REQ PHY/QHP: CPT

## 2019-05-21 ENCOUNTER — ANTI-COAG VISIT (OUTPATIENT)
Dept: PHARMACY | Age: 84
End: 2019-05-21
Payer: COMMERCIAL

## 2019-05-21 DIAGNOSIS — I48.20 CHRONIC ATRIAL FIBRILLATION (HCC): ICD-10-CM

## 2019-05-21 LAB — INTERNATIONAL NORMALIZATION RATIO, POC: 2.5

## 2019-05-21 PROCEDURE — 99211 OFF/OP EST MAY X REQ PHY/QHP: CPT

## 2019-05-21 PROCEDURE — 85610 PROTHROMBIN TIME: CPT

## 2019-05-21 NOTE — PROGRESS NOTES
Ms. Moody Oates is a 80 y.o. y/o female with history of Afib   She presents today for anticoagulation monitoring and adjustment. Pertinent PMH: HTN, HLD, CAD    Patient Reported Findings:  Yes     No  [x]   []       Patient verifies current dosing regimen as listed  []   [x]       S/S bleeding/bruising/swelling/SOB  []   [x]       Blood in urine or stool  []   [x]       Procedures scheduled in the future at this time  []   [x]       Missed Dose  []   [x]       Extra Dose  []   [x]       Change in medications  []   [x]       Change in health/diet/appetite  []   [x]       Change in alcohol use  []   [x]       Change in activity  []   [x]       Hospital admission  []   [x]       Emergency department visit  []   [x]       Other complaints  Patient continues to be stressed with taking care of her 81 y/o brother who was just placed in a nursing home. Her younger sister has passed away in the end of December 2018 from Alzheimer's. Nephew needs a liver transplant. Clinical Outcomes:  Yes     No  []   [x]       Major bleeding event  []   [x]       Thromboembolic event    Duration of warfarin Therapy: Indefinitely   INR Range:  2.0-3.0     INR is 2.5 again today. Continue same weekly dose of 1.5mg on Mon & Fri and 3mg all other days. Encouraged to maintain a consistency of vegetables/salads.   Recheck INR in 6 weeks on 7/2    Referring cardiologist is Dr. Goyo Nolacso.  INR (no units)   Date Value   05/21/2019 2.5   04/09/2019 2.5   02/26/2019 2.5   01/15/2019 2.6   09/11/2018 2.9   07/31/2018 2.2   06/19/2018 2.3   05/22/2018 3.4

## 2019-07-02 ENCOUNTER — ANTI-COAG VISIT (OUTPATIENT)
Dept: PHARMACY | Age: 84
End: 2019-07-02
Payer: COMMERCIAL

## 2019-07-02 DIAGNOSIS — I48.20 CHRONIC ATRIAL FIBRILLATION (HCC): ICD-10-CM

## 2019-07-02 LAB — INTERNATIONAL NORMALIZATION RATIO, POC: 2.9

## 2019-07-02 PROCEDURE — 85610 PROTHROMBIN TIME: CPT

## 2019-07-02 PROCEDURE — 99211 OFF/OP EST MAY X REQ PHY/QHP: CPT

## 2019-08-13 ENCOUNTER — ANTI-COAG VISIT (OUTPATIENT)
Dept: PHARMACY | Age: 84
End: 2019-08-13
Payer: COMMERCIAL

## 2019-08-13 DIAGNOSIS — I48.20 CHRONIC ATRIAL FIBRILLATION (HCC): ICD-10-CM

## 2019-08-13 LAB — INTERNATIONAL NORMALIZATION RATIO, POC: 2.8

## 2019-08-13 PROCEDURE — 85610 PROTHROMBIN TIME: CPT

## 2019-08-13 PROCEDURE — 99211 OFF/OP EST MAY X REQ PHY/QHP: CPT

## 2019-09-24 ENCOUNTER — HOSPITAL ENCOUNTER (OUTPATIENT)
Age: 84
Discharge: HOME | End: 2019-09-24
Payer: COMMERCIAL

## 2019-09-24 ENCOUNTER — ANTI-COAG VISIT (OUTPATIENT)
Dept: PHARMACY | Age: 84
End: 2019-09-24
Payer: COMMERCIAL

## 2019-09-24 DIAGNOSIS — I10 ESSENTIAL HYPERTENSION, BENIGN: Chronic | ICD-10-CM

## 2019-09-24 DIAGNOSIS — I48.91 ATRIAL FIBRILLATION, UNSPECIFIED TYPE (HCC): ICD-10-CM

## 2019-09-24 DIAGNOSIS — I48.20 CHRONIC ATRIAL FIBRILLATION (HCC): Chronic | ICD-10-CM

## 2019-09-24 DIAGNOSIS — E78.2 MIXED HYPERLIPIDEMIA: Chronic | ICD-10-CM

## 2019-09-24 LAB
ALT SERPL-CCNC: 13 U/L (ref 10–40)
ANION GAP SERPL CALCULATED.3IONS-SCNC: 14 MMOL/L (ref 3–16)
AST SERPL-CCNC: 21 U/L (ref 15–37)
BUN BLDV-MCNC: 26 MG/DL (ref 7–20)
CALCIUM SERPL-MCNC: 9.9 MG/DL (ref 8.3–10.6)
CHLORIDE BLD-SCNC: 98 MMOL/L (ref 99–110)
CHOLESTEROL, TOTAL: 154 MG/DL (ref 0–199)
CO2: 29 MMOL/L (ref 21–32)
CREAT SERPL-MCNC: 0.8 MG/DL (ref 0.6–1.2)
GFR AFRICAN AMERICAN: >60
GFR NON-AFRICAN AMERICAN: >60
GLUCOSE BLD-MCNC: 107 MG/DL (ref 70–99)
HCT VFR BLD CALC: 38.9 % (ref 36–48)
HDLC SERPL-MCNC: 99 MG/DL (ref 40–60)
HEMOGLOBIN: 12.9 G/DL (ref 12–16)
INTERNATIONAL NORMALIZATION RATIO, POC: 3.2
LDL CHOLESTEROL CALCULATED: 46 MG/DL
MCH RBC QN AUTO: 27.9 PG (ref 26–34)
MCHC RBC AUTO-ENTMCNC: 33 G/DL (ref 31–36)
MCV RBC AUTO: 84.3 FL (ref 80–100)
PDW BLD-RTO: 14.4 % (ref 12.4–15.4)
PLATELET # BLD: 267 K/UL (ref 135–450)
PMV BLD AUTO: 8.7 FL (ref 5–10.5)
POTASSIUM SERPL-SCNC: 4.2 MMOL/L (ref 3.5–5.1)
RBC # BLD: 4.61 M/UL (ref 4–5.2)
SODIUM BLD-SCNC: 141 MMOL/L (ref 136–145)
TRIGL SERPL-MCNC: 44 MG/DL (ref 0–150)
VLDLC SERPL CALC-MCNC: 9 MG/DL
WBC # BLD: 6.4 K/UL (ref 4–11)

## 2019-09-24 PROCEDURE — 80048 BASIC METABOLIC PNL TOTAL CA: CPT

## 2019-09-24 PROCEDURE — 84460 ALANINE AMINO (ALT) (SGPT): CPT

## 2019-09-24 PROCEDURE — 99211 OFF/OP EST MAY X REQ PHY/QHP: CPT

## 2019-09-24 PROCEDURE — 85027 COMPLETE CBC AUTOMATED: CPT

## 2019-09-24 PROCEDURE — 84450 TRANSFERASE (AST) (SGOT): CPT

## 2019-09-24 PROCEDURE — 85610 PROTHROMBIN TIME: CPT

## 2019-09-24 PROCEDURE — 80061 LIPID PANEL: CPT

## 2019-09-24 PROCEDURE — 36415 COLL VENOUS BLD VENIPUNCTURE: CPT

## 2019-09-27 ENCOUNTER — TELEPHONE (OUTPATIENT)
Dept: CARDIOLOGY CLINIC | Age: 84
End: 2019-09-27

## 2019-10-01 ENCOUNTER — TELEPHONE (OUTPATIENT)
Dept: CARDIOLOGY CLINIC | Age: 84
End: 2019-10-01

## 2019-11-05 ENCOUNTER — HOSPITAL ENCOUNTER (OUTPATIENT)
Age: 84
Discharge: HOME | End: 2019-11-05
Payer: COMMERCIAL

## 2019-11-05 ENCOUNTER — ANTI-COAG VISIT (OUTPATIENT)
Dept: PHARMACY | Age: 84
End: 2019-11-05
Payer: COMMERCIAL

## 2019-11-05 DIAGNOSIS — I48.91 ATRIAL FIBRILLATION, UNSPECIFIED TYPE (HCC): ICD-10-CM

## 2019-11-05 LAB
ANION GAP SERPL CALCULATED.3IONS-SCNC: 16 MMOL/L (ref 3–16)
BUN BLDV-MCNC: 31 MG/DL (ref 7–20)
CALCIUM SERPL-MCNC: 9.8 MG/DL (ref 8.3–10.6)
CHLORIDE BLD-SCNC: 95 MMOL/L (ref 99–110)
CO2: 27 MMOL/L (ref 21–32)
CREAT SERPL-MCNC: 0.8 MG/DL (ref 0.6–1.2)
GFR AFRICAN AMERICAN: >60
GFR NON-AFRICAN AMERICAN: >60
GLUCOSE BLD-MCNC: 110 MG/DL (ref 70–99)
INTERNATIONAL NORMALIZATION RATIO, POC: 2.6
MAGNESIUM: 1.9 MG/DL (ref 1.8–2.4)
POTASSIUM SERPL-SCNC: 3.7 MMOL/L (ref 3.5–5.1)
SODIUM BLD-SCNC: 138 MMOL/L (ref 136–145)
VITAMIN D 25-HYDROXY: 47 NG/ML

## 2019-11-05 PROCEDURE — 85610 PROTHROMBIN TIME: CPT

## 2019-11-05 PROCEDURE — 36415 COLL VENOUS BLD VENIPUNCTURE: CPT

## 2019-11-05 PROCEDURE — 80048 BASIC METABOLIC PNL TOTAL CA: CPT

## 2019-11-05 PROCEDURE — 83735 ASSAY OF MAGNESIUM: CPT

## 2019-11-05 PROCEDURE — 99211 OFF/OP EST MAY X REQ PHY/QHP: CPT

## 2019-11-05 PROCEDURE — 82306 VITAMIN D 25 HYDROXY: CPT

## 2019-11-26 ENCOUNTER — OFFICE VISIT (OUTPATIENT)
Dept: CARDIOLOGY CLINIC | Age: 84
End: 2019-11-26
Payer: COMMERCIAL

## 2019-11-26 VITALS
SYSTOLIC BLOOD PRESSURE: 130 MMHG | DIASTOLIC BLOOD PRESSURE: 68 MMHG | BODY MASS INDEX: 23.39 KG/M2 | HEART RATE: 50 BPM | HEIGHT: 59 IN | OXYGEN SATURATION: 98 % | WEIGHT: 116 LBS

## 2019-11-26 DIAGNOSIS — I48.20 CHRONIC ATRIAL FIBRILLATION (HCC): Chronic | ICD-10-CM

## 2019-11-26 DIAGNOSIS — E78.2 MIXED HYPERLIPIDEMIA: Chronic | ICD-10-CM

## 2019-11-26 DIAGNOSIS — I25.10 ATHEROSCLEROSIS OF NATIVE CORONARY ARTERY OF NATIVE HEART WITHOUT ANGINA PECTORIS: Primary | Chronic | ICD-10-CM

## 2019-11-26 DIAGNOSIS — I10 ESSENTIAL HYPERTENSION, BENIGN: Chronic | ICD-10-CM

## 2019-11-26 PROCEDURE — G8598 ASA/ANTIPLAT THER USED: HCPCS | Performed by: INTERNAL MEDICINE

## 2019-11-26 PROCEDURE — G8420 CALC BMI NORM PARAMETERS: HCPCS | Performed by: INTERNAL MEDICINE

## 2019-11-26 PROCEDURE — 1090F PRES/ABSN URINE INCON ASSESS: CPT | Performed by: INTERNAL MEDICINE

## 2019-11-26 PROCEDURE — G8427 DOCREV CUR MEDS BY ELIG CLIN: HCPCS | Performed by: INTERNAL MEDICINE

## 2019-11-26 PROCEDURE — 99214 OFFICE O/P EST MOD 30 MIN: CPT | Performed by: INTERNAL MEDICINE

## 2019-11-26 PROCEDURE — 4040F PNEUMOC VAC/ADMIN/RCVD: CPT | Performed by: INTERNAL MEDICINE

## 2019-11-26 PROCEDURE — 1123F ACP DISCUSS/DSCN MKR DOCD: CPT | Performed by: INTERNAL MEDICINE

## 2019-11-26 PROCEDURE — 1036F TOBACCO NON-USER: CPT | Performed by: INTERNAL MEDICINE

## 2019-11-26 PROCEDURE — G8484 FLU IMMUNIZE NO ADMIN: HCPCS | Performed by: INTERNAL MEDICINE

## 2019-11-26 RX ORDER — PINDOLOL 10 MG/1
10 TABLET ORAL 2 TIMES DAILY
Qty: 180 TABLET | Refills: 4 | Status: SHIPPED | OUTPATIENT
Start: 2019-11-26 | End: 2020-03-23 | Stop reason: ALTCHOICE

## 2019-11-26 RX ORDER — ENALAPRIL MALEATE 5 MG/1
5 TABLET ORAL 2 TIMES DAILY
Qty: 180 TABLET | Refills: 4 | Status: SHIPPED | OUTPATIENT
Start: 2019-11-26 | End: 2020-12-03 | Stop reason: SDUPTHER

## 2019-11-26 RX ORDER — HYDROCHLOROTHIAZIDE 25 MG/1
TABLET ORAL
Qty: 90 TABLET | Refills: 4 | Status: SHIPPED | OUTPATIENT
Start: 2019-11-26 | End: 2020-12-03 | Stop reason: SDUPTHER

## 2019-11-26 RX ORDER — PRAVASTATIN SODIUM 40 MG
40 TABLET ORAL DAILY
Qty: 90 TABLET | Refills: 4 | Status: SHIPPED | OUTPATIENT
Start: 2019-11-26 | End: 2020-12-01

## 2019-11-26 RX ORDER — AMLODIPINE BESYLATE 5 MG/1
5 TABLET ORAL DAILY
Qty: 90 TABLET | Refills: 4 | Status: SHIPPED | OUTPATIENT
Start: 2019-11-26 | End: 2020-12-03 | Stop reason: SDUPTHER

## 2019-12-16 ENCOUNTER — TELEPHONE (OUTPATIENT)
Dept: PHARMACY | Age: 84
End: 2019-12-16

## 2019-12-19 ENCOUNTER — ANTI-COAG VISIT (OUTPATIENT)
Dept: PHARMACY | Age: 84
End: 2019-12-19
Payer: COMMERCIAL

## 2019-12-19 DIAGNOSIS — I48.91 ATRIAL FIBRILLATION, UNSPECIFIED TYPE (HCC): ICD-10-CM

## 2019-12-19 LAB — INTERNATIONAL NORMALIZATION RATIO, POC: 2.7

## 2019-12-19 PROCEDURE — 99211 OFF/OP EST MAY X REQ PHY/QHP: CPT

## 2019-12-19 PROCEDURE — 85610 PROTHROMBIN TIME: CPT

## 2020-01-30 ENCOUNTER — ANTI-COAG VISIT (OUTPATIENT)
Dept: PHARMACY | Age: 85
End: 2020-01-30
Payer: COMMERCIAL

## 2020-01-30 LAB — INTERNATIONAL NORMALIZATION RATIO, POC: 2.6

## 2020-01-30 PROCEDURE — 99211 OFF/OP EST MAY X REQ PHY/QHP: CPT

## 2020-01-30 PROCEDURE — 85610 PROTHROMBIN TIME: CPT

## 2020-03-17 ENCOUNTER — TELEPHONE (OUTPATIENT)
Dept: PHARMACY | Age: 85
End: 2020-03-17

## 2020-03-17 NOTE — TELEPHONE ENCOUNTER
Called pt regarding appt tomorrow. Discussed health screening prior to appointment. Patient prefers to Reschedule appointment. Extended to 10 weeks total (was scheduled for 3/18 which was a 7 week apt for pt and pushed out to 4/8) dt COVID-19 but explained need for pt to contact us with questions/concerns and updated medications or if they require a RF.      Julia Garcia, BingD, [facility]

## 2020-03-17 NOTE — TELEPHONE ENCOUNTER
----- Message from Bonita Nye sent at 3/17/2020  4:45 PM EDT -----  Regarding: Medications  Contact: Patient  Robert Barrera, please call patient back about her medications. [phone]    Patient just wanted to confirm her warfarin dose.      Katharine Tuttle, PharmD, [facility]

## 2020-03-23 RX ORDER — CARVEDILOL 6.25 MG/1
6.25 TABLET ORAL 2 TIMES DAILY WITH MEALS
Qty: 180 TABLET | Refills: 4 | Status: SHIPPED | OUTPATIENT
Start: 2020-03-23 | End: 2020-12-03 | Stop reason: SDUPTHER

## 2020-03-31 ENCOUNTER — TELEPHONE (OUTPATIENT)
Dept: PHARMACY | Age: 85
End: 2020-03-31

## 2020-04-07 ENCOUNTER — TELEPHONE (OUTPATIENT)
Dept: PHARMACY | Age: 85
End: 2020-04-07

## 2020-04-15 ENCOUNTER — ANTI-COAG VISIT (OUTPATIENT)
Dept: PHARMACY | Age: 85
End: 2020-04-15
Payer: COMMERCIAL

## 2020-04-15 DIAGNOSIS — I48.91 ATRIAL FIBRILLATION, UNSPECIFIED TYPE (HCC): ICD-10-CM

## 2020-04-15 LAB
INR BLD: 2.6 (ref 0.86–1.14)
PROTHROMBIN TIME: 30.4 SEC (ref 10–13.2)

## 2020-04-15 PROCEDURE — 99211 OFF/OP EST MAY X REQ PHY/QHP: CPT

## 2020-04-16 ENCOUNTER — TELEPHONE (OUTPATIENT)
Dept: PHARMACY | Age: 85
End: 2020-04-16

## 2020-06-11 ENCOUNTER — ANTI-COAG VISIT (OUTPATIENT)
Dept: PHARMACY | Age: 85
End: 2020-06-11
Payer: COMMERCIAL

## 2020-06-11 VITALS — TEMPERATURE: 97.1 F

## 2020-06-11 LAB — INTERNATIONAL NORMALIZATION RATIO, POC: 3.6

## 2020-06-11 PROCEDURE — 99211 OFF/OP EST MAY X REQ PHY/QHP: CPT

## 2020-06-11 PROCEDURE — 85610 PROTHROMBIN TIME: CPT

## 2020-06-11 NOTE — PROGRESS NOTES
Ms. Maynor Dutton is a 80 y.o. y/o female with history of Afib   She presents today for anticoagulation monitoring and adjustment. Pertinent PMH: HTN, HLD, CAD    Patient Reported Findings:  Yes     No  [x]   []       Patient verifies current dosing regimen as listed  []   [x]       S/S bleeding/bruising/swelling/SOB  []   [x]       Blood in urine or stool  []   [x]       Procedures scheduled in the future at this time  []   [x]       Missed Dose  []   [x]       Extra Dose  []   [x]       Change in medications  []   [x]       Change in health/diet/appetite states that she has been eating salads and green beans---> had several salads   []   [x]       Change in alcohol use  []   [x]       Change in activity  []   [x]       Hospital admission  []   [x]       Emergency department visit  []   [x]       Other complaints  Patient continues to be stressed with taking care of her 81 y/o brother who was just placed in a nursing home. Her younger sister has passed away in the end of December 2018 from Alzheimer's. Nephew needs a liver transplant. Clinical Outcomes:  Yes     No  []   [x]       Major bleeding event  []   [x]       Thromboembolic event    Duration of warfarin Therapy: Indefinitely   INR Range:  2.0-3.0     INR is 3.6 today   Hold dose today then continue same weekly dose of 1.5mg on Mon & Fri and 3mg all other days. Encouraged to maintain a consistency of vegetables/salads.      Recheck INR in 5 weeks on 7/16    Referring cardiologist is Dr. Katiuska Negrete.  INR (no units)   Date Value   06/11/2020 3.6   04/15/2020 2.60 (H)   01/30/2020 2.6   12/19/2019 2.7   11/05/2019 2.6   09/11/2018 2.9   07/31/2018 2.2   06/19/2018 2.3     CLINICAL PHARMACY CONSULT: MED RECONCILIATION/REVIEW ADDENDUM    For Pharmacy Admin Tracking Only    PHSO: No  Total # of Interventions Recommended: 1  - Decreased Dose #: 1  - Maintenance Safety Lab Monitoring #: 1  Total Interventions Accepted: 1  Time Spent (min): 15    Chen Luong

## 2020-06-15 ENCOUNTER — TELEPHONE (OUTPATIENT)
Dept: PHARMACY | Age: 85
End: 2020-06-15

## 2020-06-15 NOTE — TELEPHONE ENCOUNTER
LVM asking for return call.     Marcela Elliott, PharmD, [facility]

## 2020-06-15 NOTE — TELEPHONE ENCOUNTER
Patient called to discuss elevated INR from last week. States that RN told her that vit k foods would make the INR go up, wanted to clarify. We reviewed this and corrected it and she will stay consistent with diet.      Elisa Fajardo, PharmD, [facility]

## 2020-07-07 NOTE — PATIENT INSTRUCTIONS
No medication changes  Call office if any new symptoms  Follow up in 1 year or sooner if needed Spiral Flap Text: The defect edges were debeveled with a #15 scalpel blade.  Given the location of the defect, shape of the defect and the proximity to free margins a spiral flap was deemed most appropriate.  Using a sterile surgical marker, an appropriate rotation flap was drawn incorporating the defect and placing the expected incisions within the relaxed skin tension lines where possible. The area thus outlined was incised deep to adipose tissue with a #15 scalpel blade.  The skin margins were undermined to an appropriate distance in all directions utilizing iris scissors.

## 2020-07-14 ENCOUNTER — TELEPHONE (OUTPATIENT)
Dept: PHARMACY | Age: 85
End: 2020-07-14
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
## 2020-07-23 ENCOUNTER — ANTI-COAG VISIT (OUTPATIENT)
Dept: PHARMACY | Age: 85
End: 2020-07-23
Payer: COMMERCIAL

## 2020-07-23 VITALS — TEMPERATURE: 97.3 F

## 2020-07-23 LAB — INTERNATIONAL NORMALIZATION RATIO, POC: 3.4

## 2020-07-23 PROCEDURE — 85610 PROTHROMBIN TIME: CPT

## 2020-07-23 PROCEDURE — 99211 OFF/OP EST MAY X REQ PHY/QHP: CPT

## 2020-07-23 NOTE — PROGRESS NOTES
Ms. Gonzalez Guzmán is a 80 y.o. y/o female with history of Afib   She presents today for anticoagulation monitoring and adjustment. Pertinent PMH: HTN, HLD, CAD    Patient Reported Findings:  Yes     No  [x]   []       Patient verifies current dosing regimen as listed  []   [x]       S/S bleeding/bruising/swelling/SOB- denies   []   [x]       Blood in urine or stool- denies   []   [x]       Procedures scheduled in the future at this time  []   [x]       Missed Dose  []   [x]       Extra Dose- denies   []   [x]       Change in medications--> topical steroid for leg rash  []   [x]       Change in health/diet/appetite states that she has been eating salads and green beans---> had several salads---> states no changes    []   [x]       Change in alcohol use  []   [x]       Change in activity  []   [x]       Hospital admission  []   [x]       Emergency department visit  []   [x]       Other complaints  Patient continues to be stressed with taking care of her 79 y/o brother who was just placed in a nursing home. Her younger sister has passed away in the end of December 2018 from Alzheimer's. Nephew needs a liver transplant. Clinical Outcomes:  Yes     No  []   [x]       Major bleeding event  []   [x]       Thromboembolic event    Duration of warfarin Therapy: Indefinitely   INR Range:  2.0-3.0     INR is 3.4 today   INR has been elevated recently, will dec dose. Decrease dose to 1.5mg on Mon, Wed, and Fri and 3mg all other days. (8.3% dec)  Encouraged to maintain a consistency of vegetables/salads. RF called into [facility].    Recheck INR in 2 weeks on 8/7    Referring cardiologist is Dr. Gloria Wright.  INR (no units)   Date Value   07/23/2020 3.4   06/11/2020 3.6   04/15/2020 2.60 (H)   01/30/2020 2.6   12/19/2019 2.7   09/11/2018 2.9   07/31/2018 2.2   06/19/2018 2.3     CLINICAL PHARMACY CONSULT: MED RECONCILIATION/REVIEW ADDENDUM    For Pharmacy Admin Tracking Only    PHSO: No  Total # of Interventions Recommended: 2  - Decreased Dose #: 1  - Refills Provided #: 1  - Maintenance Safety Lab Monitoring #: 1  Total Interventions Accepted: 2  Time Spent (min): [address], PharmD

## 2020-08-05 ENCOUNTER — TELEPHONE (OUTPATIENT)
Dept: PHARMACY | Age: 85
End: 2020-08-05

## 2020-08-05 NOTE — TELEPHONE ENCOUNTER
Pt s/w Adria yesterday asking to r/s her appt on 8/7 to 8/12 ( brother is dying ) . She wanted to confirm her weekly dose was to take 1.5 mgs on M/W/F and 3 mgs all other days. Read note from last appt to pt confirming dosing instructions.

## 2020-08-10 ENCOUNTER — TELEPHONE (OUTPATIENT)
Dept: PHARMACY | Age: 85
End: 2020-08-10

## 2020-08-10 NOTE — TELEPHONE ENCOUNTER
----- Message from Ramon Emery sent at 8/10/2020  1:05 PM EDT -----  Nichol Mello can you call pt she would like to speak to you .  [phone]

## 2020-08-19 ENCOUNTER — ANTI-COAG VISIT (OUTPATIENT)
Dept: PHARMACY | Age: 85
End: 2020-08-19
Payer: COMMERCIAL

## 2020-08-19 VITALS — TEMPERATURE: 98.2 F

## 2020-08-19 LAB — INTERNATIONAL NORMALIZATION RATIO, POC: 3.2

## 2020-08-19 PROCEDURE — 85610 PROTHROMBIN TIME: CPT

## 2020-08-19 PROCEDURE — 99211 OFF/OP EST MAY X REQ PHY/QHP: CPT

## 2020-08-19 NOTE — PROGRESS NOTES
Ms. Ketty Haley is a 80 y.o. y/o female with history of Afib   She presents today for anticoagulation monitoring and adjustment. Pertinent PMH: HTN, HLD, CAD    Patient Reported Findings:  Yes     No  [x]   []       Patient verifies current dosing regimen as listed  []   [x]       S/S bleeding/bruising/swelling/SOB- denies   []   [x]       Blood in urine or stool- denies   []   [x]       Procedures scheduled in the future at this time  []   [x]       Missed Dose  []   [x]       Extra Dose- denies   []   [x]       Change in medications--> topical steroid for leg rash---> no changes,   []   [x]       Change in health/diet/appetite states that she has been eating salads and green beans---> had several salads---> states no changes---> no greens with stress, will go back to eating salads 1-2 times/week    []   [x]       Change in alcohol use  []   [x]       Change in activity  []   [x]       Hospital admission  []   [x]       Emergency department visit  []   [x]       Other complaints  Patient continues to be stressed with taking care of her 81 y/o brother who was just placed in a nursing home. Her younger sister has passed away in the end of December 2018 from Alzheimer's. Nephew needs a liver transplant. Clinical Outcomes:  Yes     No  []   [x]       Major bleeding event  []   [x]       Thromboembolic event    Duration of warfarin Therapy: Indefinitely   INR Range:  2.0-3.0     INR is 3.2 today dt stress and no salads. Will go back to normal diet. Dose was decreased at last apt and then patient missed several apts dt death in the family. Continue taking dose of 1.5mg on Mon, Wed, and Fri and 3mg all other days. Encouraged to maintain a consistency of vegetables/salads.     Recheck INR in 3 weeks on 9/10- per pt request     Referring cardiologist is Dr. Neil Avila.  INR (no units)   Date Value   08/19/2020 3.2   07/23/2020 3.4   06/11/2020 3.6   04/15/2020 2.60 (H)   01/30/2020 2.6   09/11/2018 2.9 07/31/2018 2.2   06/19/2018 2.3     CLINICAL PHARMACY CONSULT: MED RECONCILIATION/REVIEW ADDENDUM    For Pharmacy Admin Tracking Only    PHSO: No  Total # of Interventions Recommended: 0  - Maintenance Safety Lab Monitoring #: 1  Total Interventions Accepted: 0  Time Spent (min): [address] Lizama, PharmD

## 2020-09-10 ENCOUNTER — TELEPHONE (OUTPATIENT)
Dept: PHARMACY | Age: 85
End: 2020-09-10

## 2020-09-10 ENCOUNTER — ANTI-COAG VISIT (OUTPATIENT)
Dept: PHARMACY | Age: 85
End: 2020-09-10
Payer: MEDICARE

## 2020-09-10 VITALS — TEMPERATURE: 96.9 F

## 2020-09-10 LAB — INTERNATIONAL NORMALIZATION RATIO, POC: 3.6

## 2020-09-10 PROCEDURE — 99211 OFF/OP EST MAY X REQ PHY/QHP: CPT

## 2020-09-10 PROCEDURE — 85610 PROTHROMBIN TIME: CPT

## 2020-09-10 NOTE — PROGRESS NOTES
Ms. Krishna Lora is a 80 y.o. y/o female with history of Afib   She presents today for anticoagulation monitoring and adjustment. Pertinent PMH: HTN, HLD, CAD    Patient Reported Findings:  Yes     No  [x]   []       Patient verifies current dosing regimen as listed  []   [x]       S/S bleeding/bruising/swelling/SOB- denies   []   [x]       Blood in urine or stool- denies   []   [x]       Procedures scheduled in the future at this time  []   [x]       Missed Dose  []   [x]       Extra Dose- denies   []   [x]       Change in medications--> topical steroid for leg rash---> no changes---> no changes   []   [x]       Change in health/diet/appetite states that she has been eating salads and green beans---> had several salads---> states no changes---> no greens with stress, will go back to eating salads 1-2 times/week---> states had greens twice weekly     []   [x]       Change in alcohol use  []   [x]       Change in activity  []   [x]       Hospital admission  []   [x]       Emergency department visit  []   [x]       Other complaints  Patient continues to be stressed with taking care of her 81 y/o brother who was just placed in a nursing home. Her younger sister has passed away in the end of December 2018 from Alzheimer's. Nephew needs a liver transplant. Clinical Outcomes:  Yes     No  []   [x]       Major bleeding event  []   [x]       Thromboembolic event    Duration of warfarin Therapy: Indefinitely   INR Range:  2.0-3.0     INR is 3.6 today despite pt adding greens back into diet. Hold tonight then decrease dose to 3mg Sun, Tues, and Thurs and 1.5mg all other days (9.1%). Encouraged to maintain a consistency of vegetables/salads.     Recheck INR in 2 weeks on 9/24    Referring cardiologist is Dr. Federico Avitia.  INR (no units)   Date Value   09/10/2020 3.6   08/19/2020 3.2   07/23/2020 3.4   06/11/2020 3.6   04/15/2020 2.60 (H)   09/11/2018 2.9   07/31/2018 2.2   06/19/2018 2.3     CLINICAL PHARMACY CONSULT: MED RECONCILIATION/REVIEW ADDENDUM    For Pharmacy Admin Tracking Only    PHSO: No  Total # of Interventions Recommended: 1  - Decreased Dose #: 1  - Maintenance Safety Lab Monitoring #: 1  Total Interventions Accepted: 1  Time Spent (min): [address] Russ Lizama PharmD

## 2020-09-10 NOTE — TELEPHONE ENCOUNTER
----- Message from [facility] Viri sent at 9/10/2020 11:10 AM EDT -----  Regarding: Please call  Contact: Patient  Andrea Haines, please call patient back @ [phone].

## 2020-09-10 NOTE — TELEPHONE ENCOUNTER
Called patient. Wanted to confirm her apt.      Josse Rhoades, PharmD, [facility]

## 2020-09-21 ENCOUNTER — TELEPHONE (OUTPATIENT)
Dept: CARDIOLOGY CLINIC | Age: 85
End: 2020-09-21

## 2020-09-21 NOTE — TELEPHONE ENCOUNTER
LMOM for patient. She is due for fasting labs. Orders placed in [facility].

## 2020-09-21 NOTE — TELEPHONE ENCOUNTER
Pt calling wanting to talk to Kristopher Carvalho about a cholesterol screening she is supposed to have for [facility] on Thurs.  Pls call to advise Thank you

## 2020-09-24 ENCOUNTER — HOSPITAL ENCOUNTER (OUTPATIENT)
Age: 85
Discharge: HOME | End: 2020-09-24
Payer: MEDICARE

## 2020-09-24 ENCOUNTER — TELEPHONE (OUTPATIENT)
Dept: CARDIOLOGY CLINIC | Age: 85
End: 2020-09-24

## 2020-09-24 ENCOUNTER — ANTI-COAG VISIT (OUTPATIENT)
Dept: PHARMACY | Age: 85
End: 2020-09-24
Payer: MEDICARE

## 2020-09-24 VITALS — TEMPERATURE: 97.7 F

## 2020-09-24 LAB
ALT SERPL-CCNC: 9 U/L (ref 10–40)
ANION GAP SERPL CALCULATED.3IONS-SCNC: 10 MMOL/L (ref 3–16)
AST SERPL-CCNC: 16 U/L (ref 15–37)
BUN BLDV-MCNC: 21 MG/DL (ref 7–20)
CALCIUM SERPL-MCNC: 9.9 MG/DL (ref 8.3–10.6)
CHLORIDE BLD-SCNC: 95 MMOL/L (ref 99–110)
CHOLESTEROL, TOTAL: 130 MG/DL (ref 0–199)
CO2: 30 MMOL/L (ref 21–32)
CREAT SERPL-MCNC: 0.7 MG/DL (ref 0.6–1.2)
GFR AFRICAN AMERICAN: >60
GFR NON-AFRICAN AMERICAN: >60
GLUCOSE BLD-MCNC: 105 MG/DL (ref 70–99)
HCT VFR BLD CALC: 38.4 % (ref 36–48)
HDLC SERPL-MCNC: 72 MG/DL (ref 40–60)
HEMOGLOBIN: 12.6 G/DL (ref 12–16)
INTERNATIONAL NORMALIZATION RATIO, POC: 2.2
LDL CHOLESTEROL CALCULATED: 48 MG/DL
MCH RBC QN AUTO: 28 PG (ref 26–34)
MCHC RBC AUTO-ENTMCNC: 32.8 G/DL (ref 31–36)
MCV RBC AUTO: 85.4 FL (ref 80–100)
PDW BLD-RTO: 15.1 % (ref 12.4–15.4)
PLATELET # BLD: 220 K/UL (ref 135–450)
PMV BLD AUTO: 8.7 FL (ref 5–10.5)
POTASSIUM SERPL-SCNC: 3.4 MMOL/L (ref 3.5–5.1)
RBC # BLD: 4.5 M/UL (ref 4–5.2)
SODIUM BLD-SCNC: 135 MMOL/L (ref 136–145)
TRIGL SERPL-MCNC: 50 MG/DL (ref 0–150)
VLDLC SERPL CALC-MCNC: 10 MG/DL
WBC # BLD: 6.7 K/UL (ref 4–11)

## 2020-09-24 PROCEDURE — 85610 PROTHROMBIN TIME: CPT

## 2020-09-24 PROCEDURE — 80061 LIPID PANEL: CPT

## 2020-09-24 PROCEDURE — 84450 TRANSFERASE (AST) (SGOT): CPT

## 2020-09-24 PROCEDURE — 80048 BASIC METABOLIC PNL TOTAL CA: CPT

## 2020-09-24 PROCEDURE — 99211 OFF/OP EST MAY X REQ PHY/QHP: CPT

## 2020-09-24 PROCEDURE — 84460 ALANINE AMINO (ALT) (SGPT): CPT

## 2020-09-24 PROCEDURE — 85027 COMPLETE CBC AUTOMATED: CPT

## 2020-09-24 PROCEDURE — 36415 COLL VENOUS BLD VENIPUNCTURE: CPT

## 2020-09-24 NOTE — TELEPHONE ENCOUNTER
Called patient. Reviewed labs and instructions. Reviewed higher potassium containing foods. She verbalized understanding. She will have her labs drawn the day she comes in for her fu with [address] [place] [address] on 12/3. She wants to only make one trip.    ----- Message from Moi Gamboa MD sent at 9/24/2020  4:25 PM EDT -----  Eat some potassium containing foods.  Recheck 2 months    ANTONINA

## 2020-09-24 NOTE — PROGRESS NOTES
Ms. Estefani Solomon is a 80 y.o. y/o female with history of Afib   She presents today for anticoagulation monitoring and adjustment. Pertinent PMH: HTN, HLD, CAD    Patient Reported Findings:  Yes     No  [x]   []       Patient verifies current dosing regimen as listed  []   [x]       S/S bleeding/bruising/swelling/SOB- denies   []   [x]       Blood in urine or stool- denies   []   [x]       Procedures scheduled in the future at this time  []   [x]       Missed Dose  []   [x]       Extra Dose- denies   []   [x]       Change in medications    []   [x]       Change in health/diet/appetite states that she has been eating salads and green beans---> had several salads---> states no changes---> no greens with stress, will go back to eating salads 1-2 times/week---> states had greens twice weekly     []   [x]       Change in alcohol use  []   [x]       Change in activity  []   [x]       Hospital admission  []   [x]       Emergency department visit  []   [x]       Other complaints  Patient continues to be stressed with taking care of her 79 y/o brother who was just placed in a nursing home. Her younger sister has passed away in the end of December 2018 from Alzheimer's. Nephew needs a liver transplant. Clinical Outcomes:  Yes     No  []   [x]       Major bleeding event  []   [x]       Thromboembolic event    Duration of warfarin Therapy: Indefinitely   INR Range:  2.0-3.0     INR is 2.2 today   Continue weekly dose of 3mg Sun, Tues, and Thurs and 1.5mg all other days. Encouraged to maintain a consistency of vegetables/salads.     Recheck INR in 4 weeks on 10/22    Referring cardiologist is Dr. Kyree Lindquist.  INR (no units)   Date Value   09/24/2020 2.2   09/10/2020 3.6   08/19/2020 3.2   07/23/2020 3.4   04/15/2020 2.60 (H)   09/11/2018 2.9   07/31/2018 2.2   06/19/2018 2.3     CLINICAL PHARMACY CONSULT: MED RECONCILIATION/REVIEW ADDENDUM    For Pharmacy Admin Tracking Only    PHSO: No  Total # of Interventions

## 2020-10-22 ENCOUNTER — ANTI-COAG VISIT (OUTPATIENT)
Dept: PHARMACY | Age: 85
End: 2020-10-22
Payer: MEDICARE

## 2020-10-22 VITALS — TEMPERATURE: 98.6 F

## 2020-10-22 LAB — INTERNATIONAL NORMALIZATION RATIO, POC: 2.7

## 2020-10-22 PROCEDURE — 99211 OFF/OP EST MAY X REQ PHY/QHP: CPT

## 2020-10-22 PROCEDURE — 85610 PROTHROMBIN TIME: CPT

## 2020-10-22 NOTE — PROGRESS NOTES
Ms. Jimbo Hilario is a 80 y.o. y/o female with history of Afib   She presents today for anticoagulation monitoring and adjustment. Pertinent PMH: HTN, HLD, CAD    Patient Reported Findings:  Yes     No  [x]   []       Patient verifies current dosing regimen as listed  []   [x]       S/S bleeding/bruising/swelling/SOB- denies   []   [x]       Blood in urine or stool- denies   []   [x]       Procedures scheduled in the future at this time  []   [x]       Missed Dose - denies   []   [x]       Extra Dose- denies   []   [x]       Change in medications    []   [x]       Change in health/diet/appetite states that she has been eating salads and green beans---> had several salads---> states no changes---> no greens with stress, will go back to eating salads 1-2 times/week---> states had greens twice weekly --> no changes, no NVD    []   [x]       Change in alcohol use  []   [x]       Change in activity  []   [x]       Hospital admission  []   [x]       Emergency department visit  []   [x]       Other complaints  Patient continues to be stressed with taking care of her 79 y/o brother who was just placed in a nursing home. Her younger sister has passed away in the end of December 2018 from Alzheimer's. Nephew needs a liver transplant. Clinical Outcomes:  Yes     No  []   [x]       Major bleeding event  []   [x]       Thromboembolic event    Duration of warfarin Therapy: Indefinitely   INR Range:  2.0-3.0     INR is 2.7 today   Continue weekly dose of 3mg Sun, Tues, and Thurs and 1.5mg all other days. Encouraged to maintain a consistency of vegetables/salads. Refill called in to [facility].    Recheck INR in 4 weeks on 11/19    Referring cardiologist is Dr. Darron Armendariz.  INR (no units)   Date Value   10/22/2020 2.7   09/24/2020 2.2   09/10/2020 3.6   08/19/2020 3.2   04/15/2020 2.60 (H)   09/11/2018 2.9   07/31/2018 2.2   06/19/2018 2.3     CLINICAL PHARMACY CONSULT: MED RECONCILIATION/REVIEW [place]  22. Tracking Only    PHSO: No  Total # of Interventions Recommended: 1  - Refills Provided #: 1  - Maintenance Safety Lab Monitoring #: 1  Total Interventions Accepted: 1  Time Spent (min): [address] PharmD

## 2020-11-24 ENCOUNTER — ANTI-COAG VISIT (OUTPATIENT)
Dept: PHARMACY | Age: 85
End: 2020-11-24
Payer: MEDICARE

## 2020-11-24 VITALS — TEMPERATURE: 96.9 F

## 2020-11-24 LAB — INTERNATIONAL NORMALIZATION RATIO, POC: 2.8

## 2020-11-24 PROCEDURE — 99211 OFF/OP EST MAY X REQ PHY/QHP: CPT

## 2020-11-24 PROCEDURE — 85610 PROTHROMBIN TIME: CPT

## 2020-11-24 NOTE — PROGRESS NOTES
Ms. Stewart Russell is a 80 y.o. y/o female with history of Afib   She presents today for anticoagulation monitoring and adjustment. Pertinent PMH: HTN, HLD, CAD    Patient Reported Findings:  Yes     No  [x]   []       Patient verifies current dosing regimen as listed  []   [x]       S/S bleeding/bruising/swelling/SOB- denies   []   [x]       Blood in urine or stool- denies   []   [x]       Procedures scheduled in the future at this time  []   [x]       Missed Dose - denies   []   [x]       Extra Dose- denies   []   [x]       Change in medications    []   [x]       Change in health/diet/appetite states that she has been eating salads and green beans--> no greens with stress, will go back to eating salads 1-2 times/week---> states had greens twice weekly --> no changes, no NVD    []   [x]       Change in alcohol use  []   [x]       Change in activity  []   [x]       Hospital admission  []   [x]       Emergency department visit  []   [x]       Other complaints  Patient continues to be stressed with taking care of her 81 y/o brother who was just placed in a nursing home. Her younger sister has passed away in the end of December 2018 from Alzheimer's. Nephew needs a liver transplant. Clinical Outcomes:  Yes     No  []   [x]       Major bleeding event  []   [x]       Thromboembolic event    Duration of warfarin Therapy: Indefinitely   INR Range:  2.0-3.0     INR is 2.8 today   Continue weekly dose of 3mg Sun, Tues, and Thurs and 1.5mg all other days. Encouraged to maintain a consistency of vegetables/salads.     Recheck INR in 6 weeks on 1/5    Referring cardiologist is Dr. Veena Headley.  INR (no units)   Date Value   11/24/2020 2.8   10/22/2020 2.7   09/24/2020 2.2   09/10/2020 3.6   04/15/2020 2.60 (H)   09/11/2018 2.9   07/31/2018 2.2   06/19/2018 2.3     CLINICAL PHARMACY CONSULT: MED RECONCILIATION/REVIEW ADDENDUM    For Pharmacy Admin Tracking Only    PHSO: No  Total # of Interventions Recommended: 0  - Maintenance Safety Lab Monitoring #: 1  Total Interventions Accepted: 0  Time Spent (min): 15    Gloria Tan, BingD

## 2020-12-01 RX ORDER — PRAVASTATIN SODIUM 40 MG
TABLET ORAL
Qty: 90 TABLET | Refills: 3 | Status: SHIPPED | OUTPATIENT
Start: 2020-12-01 | End: 2021-08-09 | Stop reason: SDUPTHER

## 2020-12-03 ENCOUNTER — HOSPITAL ENCOUNTER (OUTPATIENT)
Age: 85
Discharge: HOME | End: 2020-12-03
Payer: MEDICARE

## 2020-12-03 ENCOUNTER — OFFICE VISIT (OUTPATIENT)
Dept: CARDIOLOGY CLINIC | Age: 85
End: 2020-12-03
Payer: MEDICARE

## 2020-12-03 VITALS
RESPIRATION RATE: 21 BRPM | DIASTOLIC BLOOD PRESSURE: 60 MMHG | HEIGHT: 59 IN | SYSTOLIC BLOOD PRESSURE: 110 MMHG | HEART RATE: 50 BPM | OXYGEN SATURATION: 97 % | BODY MASS INDEX: 23.47 KG/M2 | WEIGHT: 116.4 LBS

## 2020-12-03 PROBLEM — R00.1 BRADYCARDIA: Status: ACTIVE | Noted: 2020-12-03

## 2020-12-03 LAB
ANION GAP SERPL CALCULATED.3IONS-SCNC: 10 MMOL/L (ref 3–16)
BUN BLDV-MCNC: 18 MG/DL (ref 7–20)
CALCIUM SERPL-MCNC: 9.5 MG/DL (ref 8.3–10.6)
CHLORIDE BLD-SCNC: 98 MMOL/L (ref 99–110)
CO2: 32 MMOL/L (ref 21–32)
CREAT SERPL-MCNC: 0.7 MG/DL (ref 0.6–1.2)
GFR AFRICAN AMERICAN: >60
GFR NON-AFRICAN AMERICAN: >60
GLUCOSE BLD-MCNC: 73 MG/DL (ref 70–99)
POTASSIUM SERPL-SCNC: 3.5 MMOL/L (ref 3.5–5.1)
SODIUM BLD-SCNC: 140 MMOL/L (ref 136–145)

## 2020-12-03 PROCEDURE — 36415 COLL VENOUS BLD VENIPUNCTURE: CPT

## 2020-12-03 PROCEDURE — 80048 BASIC METABOLIC PNL TOTAL CA: CPT

## 2020-12-03 PROCEDURE — 99214 OFFICE O/P EST MOD 30 MIN: CPT | Performed by: INTERNAL MEDICINE

## 2020-12-03 RX ORDER — CARVEDILOL 3.12 MG/1
3.12 TABLET ORAL 2 TIMES DAILY WITH MEALS
Qty: 180 TABLET | Refills: 4 | Status: SHIPPED | OUTPATIENT
Start: 2020-12-03 | End: 2021-08-09

## 2020-12-03 RX ORDER — AMLODIPINE BESYLATE 5 MG/1
5 TABLET ORAL DAILY
Qty: 90 TABLET | Refills: 4 | Status: SHIPPED | OUTPATIENT
Start: 2020-12-03 | End: 2021-08-09 | Stop reason: SDUPTHER

## 2020-12-03 RX ORDER — ENALAPRIL MALEATE 5 MG/1
5 TABLET ORAL 2 TIMES DAILY
Qty: 180 TABLET | Refills: 4 | Status: SHIPPED | OUTPATIENT
Start: 2020-12-03 | End: 2021-08-09 | Stop reason: SDUPTHER

## 2020-12-03 RX ORDER — HYDROCHLOROTHIAZIDE 25 MG/1
TABLET ORAL
Qty: 90 TABLET | Refills: 4 | Status: SHIPPED | OUTPATIENT
Start: 2020-12-03 | End: 2021-08-09 | Stop reason: SDUPTHER

## 2020-12-03 NOTE — PROGRESS NOTES
[facility]  Cardiac Follow Up     Referring Provider:  Karina Hilario MD     Chief Complaint   Patient presents with    1 Year Follow Up    Atrial Fibrillation    Coronary Artery Disease        History of Present Illness:  Ms. Gayle Duncan is here today for regular follow up of her AF, CAD, HTN, hyperlipidemia. She is doing well. Some issues with \"arthitis\" and uses Meredith Searing. No chest pain. Tolerating coumadin without bleeding and INR followed in coumadin clinic. Past Medical History:   has a past medical history of Arthritis, Carpal tunnel syndrome, Cataract, Gastric ulcer, GERD (gastroesophageal reflux disease), Hyperlipidemia, Hypertension, and Peptic ulcer. Surgical History:   has a past surgical history that includes Hysterectomy; Cataract removal; and laminectomy. Social History:   reports that she quit smoking about 35 years ago. She has never used smokeless tobacco. She reports current alcohol use. She reports that she does not use drugs. Family History:  family history includes Other in her brother and sister; Stroke in her father. Home Medications:  Outpatient Encounter Medications as of 12/3/2020   Medication Sig Dispense Refill    pravastatin (PRAVACHOL) 40 MG tablet TAKE 1 TABLET DAILY 90 tablet 3    carvedilol (COREG) 6.25 MG tablet Take 1 tablet by mouth 2 times daily (with meals) 180 tablet 4    hydrochlorothiazide (HYDRODIURIL) 25 MG tablet TAKE 1 TABLET BY MOUTH DAILY. 90 tablet 4    enalapril (VASOTEC) 5 MG tablet Take 1 tablet by mouth 2 times daily 180 tablet 4    amLODIPine (NORVASC) 5 MG tablet Take 1 tablet by mouth daily 90 tablet 4    pantoprazole (PROTONIX) 40 MG tablet Take 40 mg by mouth daily      acetaminophen (TYLENOL ARTHRITIS PAIN) 650 MG CR tablet Take 650 mg by mouth every 8 hours as needed for Pain      warfarin (COUMADIN) 3 MG tablet Take 3 mg by mouth See Admin Instructions.  Dose adjusted by [facility] Coag clinic       No facility-administered encounter medications on file as of 12/3/2020. Allergies:  Patient has no known allergies. [x] Medications and dosages reviewed. ROS:  [x]Full ROS obtained and negative except as mentioned in HPI    Physical Examination:    Vitals:    12/03/20 0920   BP: 110/60   Pulse: 50   Resp: 21   SpO2: 97%      /60 (Site: Left Upper Arm, Position: Sitting, Cuff Size: Medium Adult)   Pulse 50   Resp 21   Ht 4' 11\" (1.499 m)   Wt 116 lb 6.4 oz (52.8 kg)   SpO2 97%   Breastfeeding No   BMI 23.51 kg/m²     · GENERAL: Well developed, well nourished, No acute distress  · NEUROLOGICAL: Alert and oriented  · PSYCH: Calm affect  · SKIN: Warm and dry, no rash  · HEENT: Normocephalic, Sclera non-icteric, mucus membranes moist  · NECK: supple, JVP normal  · CAROTID: Normal upstroke, no bruits  · CARDIAC: Normal PMI, irregular bradycardic rhythm, normal S1S2, No murmur, rub, or gallop  · RESPIRATORY: Normal respiratory effort,   · EXTREMITIES: No LE edema  · MUSCULOSKELETAL: No joint swelling or tenderness, no chest wall tenderness  · GASTROINTESTINAL: normal bowel sounds, soft, non-tender, no bruit      Assessment:     Mixed hyperlipidemia   Controlled,  LDL 48, on Pravastatin. LFTS normal (9/20/2020)    Essential hypertension, benign  /60 (Site: Left Upper Arm, Position: Sitting, Cuff Size: Medium Adult)   Pulse 50   Resp 21   Ht 4' 11\" (1.499 m)   Wt 116 lb 6.4 oz (52.8 kg)   SpO2 97%   Breastfeeding No   BMI 23.51 kg/m²   Well controlled. Continue same meds. Potassium was low (3.4) 9/2020. \"Eating bananas\". Repeat chem-7 today    Atrial fibrillation  Doing well. HR controlled, on Coumadin (will continue), managed per [facility] clinic  Stable. Same Rx    Coronary atherosclerosis of native coronary artery  Cath 2000> 50% LAD  January 2011 GXT Myoview> stable, normal EF  Asymptomatic. Stable. Continue medical Rx.     Bradycardia:  HR=50, asymptomatic  Decrease coreg from 6.25 to 3.125    Plan:  Decrease coreg  Check potassium  F/u 6 months    Thank you for allowing me to participate in the care of this individual.      Warren Carey M.D., [address] [facility]

## 2020-12-03 NOTE — PATIENT INSTRUCTIONS
Labs today (we will call you with results)    Decrease Coreg to 3.125 mg twice a day  You will take a half tablet Coreg twice a day until you get new script from [facility]  Once you get new script from [facility] take a whole tablet twice a day       Taking Coumadin and arthritis medications can increase bleeding risk (Dr Cheryle Mayer is fine with [facility])  Follow up in 6 months

## 2020-12-04 ENCOUNTER — TELEPHONE (OUTPATIENT)
Dept: CARDIOLOGY CLINIC | Age: 85
End: 2020-12-04

## 2021-01-05 ENCOUNTER — APPOINTMENT (OUTPATIENT)
Dept: PHARMACY | Age: 86
End: 2021-01-05
Payer: MEDICARE

## 2021-01-06 ENCOUNTER — ANTI-COAG VISIT (OUTPATIENT)
Dept: PHARMACY | Age: 86
End: 2021-01-06
Payer: MEDICARE

## 2021-01-06 DIAGNOSIS — I48.91 ATRIAL FIBRILLATION, UNSPECIFIED TYPE (HCC): ICD-10-CM

## 2021-01-06 LAB — INTERNATIONAL NORMALIZATION RATIO, POC: 3.2

## 2021-01-06 PROCEDURE — 99211 OFF/OP EST MAY X REQ PHY/QHP: CPT

## 2021-01-06 PROCEDURE — 85610 PROTHROMBIN TIME: CPT

## 2021-01-06 NOTE — PROGRESS NOTES
Ms. Joe Dove is a 80 y.o. y/o female with history of Afib   She presents today for anticoagulation monitoring and adjustment. Pertinent PMH: HTN, HLD, CAD    Patient Reported Findings:  Yes     No  [x]   []       Patient verifies current dosing regimen as listed  []   [x]       S/S bleeding/bruising/swelling/SOB- denies   []   [x]       Blood in urine or stool- denies   []   [x]       Procedures scheduled in the future at this time  []   [x]       Missed Dose - denies   []   [x]       Extra Dose- denies   []   [x]       Change in medications- coreg dec    []   [x]       Change in health/diet/appetite states that she has been eating salads and green beans--> no greens with stress, will go back to eating salads 1-2 times/week---> states had greens twice weekly --> no changes, no NVD    []   [x]       Change in alcohol use- doesn't drink   []   [x]       Change in activity  []   [x]       Hospital admission  []   [x]       Emergency department visit  []   [x]       Other complaints  Patient continues to be stressed with taking care of her 81 y/o brother who was just placed in a nursing home. Her younger sister has passed away in the end of December 2018 from Alzheimer's. Nephew needs a liver transplant. Clinical Outcomes:  Yes     No  []   [x]       Major bleeding event  []   [x]       Thromboembolic event    Duration of warfarin Therapy: Indefinitely   INR Range:  2.0-3.0     INR is 3.2 today dt less greens- will add in one more side salad. Continue weekly dose of 3mg Sun, Tues, and Thurs and 1.5mg all other days. Encouraged to maintain a consistency of vegetables/salads. RF called into [facility].   Recheck INR in 6 weeks on 2/17    Referring cardiologist is Dr. Lisset Ho.  INR (no units)   Date Value   01/06/2021 3.2   11/24/2020 2.8   10/22/2020 2.7   09/24/2020 2.2   04/15/2020 2.60 (H)   09/11/2018 2.9   07/31/2018 2.2   06/19/2018 2.3     CLINICAL PHARMACY CONSULT: MED RECONCILIATION/REVIEW [address] Only    PHSO: No  Total # of Interventions Recommended: 1  - Refills Provided #: 1  - Maintenance Safety Lab Monitoring #: 1  Total Interventions Accepted: 1  Time Spent (min): [address] Russ Lizama, PharmD

## 2021-02-25 ENCOUNTER — ANTI-COAG VISIT (OUTPATIENT)
Dept: PHARMACY | Age: 86
End: 2021-02-25
Payer: MEDICARE

## 2021-02-25 DIAGNOSIS — I48.91 ATRIAL FIBRILLATION, UNSPECIFIED TYPE (HCC): ICD-10-CM

## 2021-02-25 LAB — INTERNATIONAL NORMALIZATION RATIO, POC: 2.5

## 2021-02-25 PROCEDURE — 99211 OFF/OP EST MAY X REQ PHY/QHP: CPT

## 2021-02-25 PROCEDURE — 85610 PROTHROMBIN TIME: CPT

## 2021-02-25 NOTE — PROGRESS NOTES
Ms. Shaun Kerr is a 80 y.o. y/o female with history of Afib   She presents today for anticoagulation monitoring and adjustment. Pertinent PMH: HTN, HLD, CAD    Patient Reported Findings:  Yes     No  [x]   []       Patient verifies current dosing regimen as listed  []   [x]       S/S bleeding/bruising/swelling/SOB- denies   []   [x]       Blood in urine or stool- denies   []   [x]       Procedures scheduled in the future at this time  []   [x]       Missed Dose - denies   []   [x]       Extra Dose- denies   []   [x]       Change in medications- coreg dec---> no changes     []   [x]       Change in health/diet/appetite states that she has been eating salads and green beans--> no greens with stress, will go back to eating salads 1-2 times/week---> states had greens twice weekly --> no changes, no NVD    []   [x]       Change in alcohol use- doesn't drink   []   [x]       Change in activity  []   [x]       Hospital admission  []   [x]       Emergency department visit  []   [x]       Other complaints  Patient continues to be stressed with taking care of her 79 y/o brother who was just placed in a nursing home. Her younger sister has passed away in the end of December 2018 from Alzheimer's. Nephew needs a liver transplant. Clinical Outcomes:  Yes     No  []   [x]       Major bleeding event  []   [x]       Thromboembolic event    Duration of warfarin Therapy: Indefinitely   INR Range:  2.0-3.0     INR is 2.5 today   Continue weekly dose of 3mg Sun, Tues, and Thurs and 1.5mg all other days. Encouraged to maintain a consistency of vegetables/salads.     Recheck INR in 6 weeks on 4/8    Referring cardiologist is Dr. Sherman Negrete.  INR (no units)   Date Value   02/25/2021 2.5   01/06/2021 3.2   11/24/2020 2.8   10/22/2020 2.7   04/15/2020 2.60 (H)   09/11/2018 2.9   07/31/2018 2.2   06/19/2018 2.3     CLINICAL PHARMACY CONSULT: MED RECONCILIATION/REVIEW ADDENDUM    For Pharmacy Admin Tracking Only    PHSO: No  Total # of Interventions Recommended: 0  - Maintenance Safety Lab Monitoring #: 1  Total Interventions Accepted: 0  Time Spent (min): [address] Russ Lizama, PharmD

## 2021-04-12 ENCOUNTER — ANTI-COAG VISIT (OUTPATIENT)
Dept: PHARMACY | Age: 86
End: 2021-04-12
Payer: MEDICARE

## 2021-04-12 DIAGNOSIS — I48.91 ATRIAL FIBRILLATION, UNSPECIFIED TYPE (HCC): ICD-10-CM

## 2021-04-12 LAB — INTERNATIONAL NORMALIZATION RATIO, POC: 2.7

## 2021-04-12 PROCEDURE — 85610 PROTHROMBIN TIME: CPT

## 2021-04-12 PROCEDURE — 99211 OFF/OP EST MAY X REQ PHY/QHP: CPT

## 2021-04-12 NOTE — PROGRESS NOTES
of Interventions Recommended: 0  - Maintenance Safety Lab Monitoring #: 1  Total Interventions Accepted: 0  Time Spent (min): 15    Kalen Benton, BingD

## 2021-05-27 ENCOUNTER — ANTI-COAG VISIT (OUTPATIENT)
Dept: PHARMACY | Age: 86
End: 2021-05-27
Payer: MEDICARE

## 2021-05-27 DIAGNOSIS — I48.91 ATRIAL FIBRILLATION, UNSPECIFIED TYPE (HCC): Primary | ICD-10-CM

## 2021-05-27 LAB — INTERNATIONAL NORMALIZATION RATIO, POC: 3.2

## 2021-05-27 PROCEDURE — 85610 PROTHROMBIN TIME: CPT

## 2021-05-27 PROCEDURE — 99211 OFF/OP EST MAY X REQ PHY/QHP: CPT

## 2021-05-27 NOTE — TELEPHONE ENCOUNTER
Warfarin prescription phoned into [facility] to [address] [facility] under Dr. Robert Dillon  Warfarin 3 mg tabs  Take 3 mg (3 mg x 1) every Sun, Tue, Thu; 1.5 mg (3 mg x 0.5) all other days  90 days   2 refills  Deangelo Brown PharmD, [facility]

## 2021-05-27 NOTE — PROGRESS NOTES
Ms. Yamileth Suarez is a 80 y.o. y/o female with history of Afib   She presents today for anticoagulation monitoring and adjustment. Pertinent PMH: HTN, HLD, CAD    Patient Reported Findings:  Yes     No  [x]   []       Patient verifies current dosing regimen as listed  []   [x]       S/S bleeding/bruising/swelling/SOB- denies   []   [x]       Blood in urine or stool- denies   []   [x]       Procedures scheduled in the future at this time  []   [x]       Missed Dose - denies   []   [x]       Extra Dose- denies   []   [x]       Change in medications- coreg dec---> no changes     []   [x]       Change in health/diet/appetite states that she has been eating salads and green beans--> no greens with stress, will go back to eating salads 1-2 times/week---> states had greens twice weekly --> no changes, no NVD---> maybe less greens, no NVD    []   [x]       Change in alcohol use- doesn't drink   []   [x]       Change in activity  []   [x]       Hospital admission  []   [x]       Emergency department visit  []   [x]       Other complaints  Patient continues to be stressed with taking care of her 81 y/o brother who was just placed in a nursing home. Her younger sister has passed away in the end of December 2018 from Alzheimer's. Nephew needs a liver transplant. Clinical Outcomes:  Yes     No  []   [x]       Major bleeding event  []   [x]       Thromboembolic event    Duration of warfarin Therapy: Indefinitely   INR Range:  2.0-3.0     INR is 3.2 today   Continue weekly dose of 3mg Sun, Tues, and Thurs and 1.5mg all other days. Encouraged to maintain a consistency of vegetables/salads. RF called into [facility].   Recheck INR in 6 weeks on 7/7    Referring cardiologist is Dr. Greg Ma.  INR (no units)   Date Value   05/27/2021 3.2   04/12/2021 2.7   02/25/2021 2.5   01/06/2021 3.2   04/15/2020 2.60 (H)   09/11/2018 2.9   07/31/2018 2.2   06/19/2018 2.3     For [facility] Intervention Detail: Refill(s) Provided   Total # of Interventions Recommended: 1   Total # of Interventions Accepted: 1   Time Spent (min): [address], [address] [facility], PharmD

## 2021-06-01 ENCOUNTER — TELEPHONE (OUTPATIENT)
Dept: CARDIOLOGY CLINIC | Age: 86
End: 2021-06-01

## 2021-06-01 DIAGNOSIS — I25.10 CORONARY ARTERY DISEASE INVOLVING NATIVE CORONARY ARTERY OF NATIVE HEART WITHOUT ANGINA PECTORIS: Primary | ICD-10-CM

## 2021-06-01 NOTE — TELEPHONE ENCOUNTER
Called patient. Fasting lab orders placed. She will come on Friday of this week for labs and OV next week.

## 2021-06-04 ENCOUNTER — HOSPITAL ENCOUNTER (OUTPATIENT)
Age: 86
Discharge: HOME | End: 2021-06-04
Payer: MEDICARE

## 2021-06-04 ENCOUNTER — TELEPHONE (OUTPATIENT)
Dept: CARDIOLOGY CLINIC | Age: 86
End: 2021-06-04

## 2021-06-04 DIAGNOSIS — I25.10 CORONARY ARTERY DISEASE INVOLVING NATIVE CORONARY ARTERY OF NATIVE HEART WITHOUT ANGINA PECTORIS: ICD-10-CM

## 2021-06-04 LAB
ALT SERPL-CCNC: 9 U/L (ref 10–40)
ANION GAP SERPL CALCULATED.3IONS-SCNC: 8 MMOL/L (ref 3–16)
AST SERPL-CCNC: 18 U/L (ref 15–37)
BUN BLDV-MCNC: 24 MG/DL (ref 7–20)
CALCIUM SERPL-MCNC: 9.7 MG/DL (ref 8.3–10.6)
CHLORIDE BLD-SCNC: 95 MMOL/L (ref 99–110)
CHOLESTEROL, FASTING: 144 MG/DL (ref 0–199)
CO2: 30 MMOL/L (ref 21–32)
CREAT SERPL-MCNC: 0.8 MG/DL (ref 0.6–1.2)
GFR AFRICAN AMERICAN: >60
GFR NON-AFRICAN AMERICAN: >60
GLUCOSE BLD-MCNC: 91 MG/DL (ref 70–99)
HCT VFR BLD CALC: 38.5 % (ref 36–48)
HDLC SERPL-MCNC: 72 MG/DL (ref 40–60)
HEMOGLOBIN: 13 G/DL (ref 12–16)
LDL CHOLESTEROL CALCULATED: 61 MG/DL
MCH RBC QN AUTO: 28.3 PG (ref 26–34)
MCHC RBC AUTO-ENTMCNC: 33.8 G/DL (ref 31–36)
MCV RBC AUTO: 83.8 FL (ref 80–100)
PDW BLD-RTO: 15.7 % (ref 12.4–15.4)
PLATELET # BLD: 231 K/UL (ref 135–450)
PMV BLD AUTO: 8.7 FL (ref 5–10.5)
POTASSIUM SERPL-SCNC: 3.6 MMOL/L (ref 3.5–5.1)
RBC # BLD: 4.59 M/UL (ref 4–5.2)
SODIUM BLD-SCNC: 133 MMOL/L (ref 136–145)
TRIGLYCERIDE, FASTING: 57 MG/DL (ref 0–150)
VLDLC SERPL CALC-MCNC: 11 MG/DL
WBC # BLD: 5.9 K/UL (ref 4–11)

## 2021-06-04 PROCEDURE — 80061 LIPID PANEL: CPT

## 2021-06-04 PROCEDURE — 36415 COLL VENOUS BLD VENIPUNCTURE: CPT

## 2021-06-04 PROCEDURE — 85027 COMPLETE CBC AUTOMATED: CPT

## 2021-06-04 PROCEDURE — 84460 ALANINE AMINO (ALT) (SGPT): CPT

## 2021-06-04 PROCEDURE — 80048 BASIC METABOLIC PNL TOTAL CA: CPT

## 2021-06-04 PROCEDURE — 84450 TRANSFERASE (AST) (SGOT): CPT

## 2021-06-04 NOTE — TELEPHONE ENCOUNTER
----- Message from Terrence Hannon MD sent at 6/4/2021  3:54 PM EDT -----  Tell pt. their labs are good. F/u in clinic as planned. Sodium slightly low. Decrease water intake a little.     [facility]

## 2021-06-04 NOTE — TELEPHONE ENCOUNTER
Call placed to patient who was not available for phone message. lmor to call back to discuss message below.

## 2021-06-09 ENCOUNTER — TELEPHONE (OUTPATIENT)
Dept: CARDIOLOGY CLINIC | Age: 86
End: 2021-06-09

## 2021-06-09 NOTE — TELEPHONE ENCOUNTER
Pt calling wanting to talk to [facility]. She had to cancel her appt for tomorrow and needs to ask when she can antonette?  Pls call to advise Thank you

## 2021-06-28 NOTE — TELEPHONE ENCOUNTER
Pt calling. Pt would like a copy of chloesterol screening that was done to her today mailed to her home.  Thank you [FreeTextEntry1] : PE exam was normal today . BP was borderline elevated, Pt was advised to watch excessive caffeine and Sodium intake , He has also been advised to increase exercise , hydrate and work harder on weight loss .\par We will check Covid IGG today to assess if pt has antibodies due to recent Covid Infection \par Advised to return for PE in one year and monitor BPs at home \par

## 2021-07-07 ENCOUNTER — ANTI-COAG VISIT (OUTPATIENT)
Dept: PHARMACY | Age: 86
End: 2021-07-07
Payer: MEDICARE

## 2021-07-07 DIAGNOSIS — I48.91 ATRIAL FIBRILLATION, UNSPECIFIED TYPE (HCC): Primary | ICD-10-CM

## 2021-07-07 LAB — INTERNATIONAL NORMALIZATION RATIO, POC: 3.2

## 2021-07-07 PROCEDURE — 99211 OFF/OP EST MAY X REQ PHY/QHP: CPT

## 2021-07-07 PROCEDURE — 85610 PROTHROMBIN TIME: CPT

## 2021-07-07 NOTE — PROGRESS NOTES
Ms. Anne-Marie Hernandes is a 80 y.o. y/o female with history of Afib   She presents today for anticoagulation monitoring and adjustment. Pertinent PMH: HTN, HLD, CAD    Patient Reported Findings:  Yes     No  [x]   []       Patient verifies current dosing regimen as listed  []   [x]       S/S bleeding/bruising/swelling/SOB- denies   []   [x]       Blood in urine or stool- denies   []   [x]       Procedures scheduled in the future at this time  []   [x]       Missed Dose - denies   []   [x]       Extra Dose- denies   []   [x]       Change in medications- coreg dec---> no changes     []   [x]       Change in health/diet/appetite states that she has been eating salads and green beans--> no greens with stress, will go back to eating salads 1-2 times/week---> states had greens twice weekly --> no changes, no NVD---> maybe less greens, no NVD---> had 2 salads     []   [x]       Change in alcohol use- doesn't drink   []   [x]       Change in activity  []   [x]       Hospital admission  []   [x]       Emergency department visit  []   [x]       Other complaints  Patient continues to be stressed with taking care of her 79 y/o brother who was just placed in a nursing home. Her younger sister has passed away in the end of December 2018 from Alzheimer's. Nephew needs a liver transplant. Clinical Outcomes:  Yes     No  []   [x]       Major bleeding event  []   [x]       Thromboembolic event    Duration of warfarin Therapy: Indefinitely   INR Range:  2.0-3.0     INR is 3.2 again today   Decrease dose to 3mg Sun and Thurs and 1.5mg all other days (10%). Encouraged to maintain a consistency of vegetables/salads.     Recheck INR in 2 weeks on 7/21 then go back to 6 weeks     Referring cardiologist is Dr. Stevie Moreno.  INR (no units)   Date Value   07/07/2021 3.2   05/27/2021 3.2   04/12/2021 2.7   02/25/2021 2.5   04/15/2020 2.60 (H)   09/11/2018 2.9   07/31/2018 2.2   06/19/2018 2.3     For Pharmacy Admin Tracking Only     Intervention Detail: Dose Adjustment: 1, reason: Therapy Optimization   Total # of Interventions Recommended: 1   Total # of Interventions Accepted: 1   Time Spent (min): [address] [facility], PharmD

## 2021-07-21 ENCOUNTER — ANTI-COAG VISIT (OUTPATIENT)
Dept: PHARMACY | Age: 86
End: 2021-07-21
Payer: MEDICARE

## 2021-07-21 DIAGNOSIS — I48.91 ATRIAL FIBRILLATION, UNSPECIFIED TYPE (HCC): Primary | ICD-10-CM

## 2021-07-21 LAB — INTERNATIONAL NORMALIZATION RATIO, POC: 2.5

## 2021-07-21 PROCEDURE — 99211 OFF/OP EST MAY X REQ PHY/QHP: CPT

## 2021-07-21 PROCEDURE — 85610 PROTHROMBIN TIME: CPT

## 2021-07-21 NOTE — PROGRESS NOTES
Ms. Pablito Wheeler is a 80 y.o. y/o female with history of Afib   She presents today for anticoagulation monitoring and adjustment. Pertinent PMH: HTN, HLD, CAD    Patient Reported Findings:  Yes     No  [x]   []       Patient verifies current dosing regimen as listed  []   [x]       S/S bleeding/bruising/swelling/SOB- denies   []   [x]       Blood in urine or stool- denies   []   [x]       Procedures scheduled in the future at this time  []   [x]       Missed Dose - denies   []   [x]       Extra Dose- denies   []   [x]       Change in medications- coreg dec---> no changes     []   [x]       Change in health/diet/appetite states that she has been eating salads and green beans--> no greens with stress, will go back to eating salads 1-2 times/week---> states had greens twice weekly --> no changes, no NVD---> maybe less greens, no NVD---> had 2 salads --> no changes, no NVD  []   [x]       Change in alcohol use- doesn't drink   []   [x]       Change in activity  []   [x]       Hospital admission  []   [x]       Emergency department visit  []   [x]       Other complaints  Patient continues to be stressed with taking care of her 79 y/o brother who was just placed in a nursing home. Her younger sister has passed away in the end of December 2018 from Alzheimer's. Nephew needs a liver transplant. Clinical Outcomes:  Yes     No  []   [x]       Major bleeding event  []   [x]       Thromboembolic event    Duration of warfarin Therapy: Indefinitely   INR Range:  2.0-3.0     INR is 2.5 today, 7/21  Dose dec at last visit. Continue 3mg Sun and Thurs and 1.5mg all other days  Encouraged to maintain a consistency of vegetables/salads.     Recheck INR in 6 weeks on 9/1     Referring cardiologist is Dr. Aniket Hurst.  INR (no units)   Date Value   07/07/2021 3.2   05/27/2021 3.2   04/12/2021 2.7   02/25/2021 2.5   04/15/2020 2.60 (H)   09/11/2018 2.9   07/31/2018 2.2   06/19/2018 2.3     For Pharmacy Admin Tracking Only     Total # of Interventions Recommended: 0   Total # of Interventions Accepted: 0   Time Spent (min): [address], [address] [facility], PharmD

## 2021-08-09 ENCOUNTER — OFFICE VISIT (OUTPATIENT)
Dept: CARDIOLOGY CLINIC | Age: 86
End: 2021-08-09
Payer: MEDICARE

## 2021-08-09 VITALS
HEART RATE: 47 BPM | DIASTOLIC BLOOD PRESSURE: 60 MMHG | BODY MASS INDEX: 23.06 KG/M2 | SYSTOLIC BLOOD PRESSURE: 110 MMHG | OXYGEN SATURATION: 99 % | HEIGHT: 59 IN | WEIGHT: 114.4 LBS

## 2021-08-09 DIAGNOSIS — I25.10 ATHEROSCLEROSIS OF NATIVE CORONARY ARTERY OF NATIVE HEART WITHOUT ANGINA PECTORIS: Primary | Chronic | ICD-10-CM

## 2021-08-09 DIAGNOSIS — I25.10 CORONARY ARTERY DISEASE DUE TO LIPID RICH PLAQUE: ICD-10-CM

## 2021-08-09 DIAGNOSIS — E78.2 MIXED HYPERLIPIDEMIA: Chronic | ICD-10-CM

## 2021-08-09 DIAGNOSIS — I10 ESSENTIAL HYPERTENSION, BENIGN: Chronic | ICD-10-CM

## 2021-08-09 DIAGNOSIS — I25.83 CORONARY ARTERY DISEASE DUE TO LIPID RICH PLAQUE: ICD-10-CM

## 2021-08-09 DIAGNOSIS — I48.91 ATRIAL FIBRILLATION, UNSPECIFIED TYPE (HCC): Chronic | ICD-10-CM

## 2021-08-09 DIAGNOSIS — R00.1 BRADYCARDIA: ICD-10-CM

## 2021-08-09 PROCEDURE — 93000 ELECTROCARDIOGRAM COMPLETE: CPT | Performed by: INTERNAL MEDICINE

## 2021-08-09 PROCEDURE — 99214 OFFICE O/P EST MOD 30 MIN: CPT | Performed by: INTERNAL MEDICINE

## 2021-08-09 RX ORDER — AMLODIPINE BESYLATE 5 MG/1
5 TABLET ORAL DAILY
Qty: 90 TABLET | Refills: 4 | Status: SHIPPED | OUTPATIENT
Start: 2021-08-09 | End: 2021-12-29 | Stop reason: SDUPTHER

## 2021-08-09 RX ORDER — HYDROCHLOROTHIAZIDE 25 MG/1
TABLET ORAL
Qty: 90 TABLET | Refills: 4 | Status: SHIPPED | OUTPATIENT
Start: 2021-08-09 | End: 2021-12-29 | Stop reason: SDUPTHER

## 2021-08-09 RX ORDER — CARVEDILOL 3.12 MG/1
3.12 TABLET ORAL 2 TIMES DAILY WITH MEALS
Qty: 180 TABLET | Refills: 4 | Status: CANCELLED | OUTPATIENT
Start: 2021-08-09

## 2021-08-09 RX ORDER — PRAVASTATIN SODIUM 40 MG
TABLET ORAL
Qty: 90 TABLET | Refills: 4 | Status: SHIPPED | OUTPATIENT
Start: 2021-08-09 | End: 2021-12-29 | Stop reason: SDUPTHER

## 2021-08-09 RX ORDER — ENALAPRIL MALEATE 5 MG/1
5 TABLET ORAL 2 TIMES DAILY
Qty: 180 TABLET | Refills: 4 | Status: SHIPPED | OUTPATIENT
Start: 2021-08-09 | End: 2021-12-29 | Stop reason: SDUPTHER

## 2021-08-09 NOTE — PROGRESS NOTES
[address]  Cardiac Follow Up     Referring Provider:  Chet Vargas MD     Chief Complaint   Patient presents with    Atrial Fibrillation    Coronary Artery Disease    Bradycardia        History of Present Illness:  Ms. Claudio Reina is here today for regular follow up of her AF, CAD, HTN, hyperlipidemia. She is doing well. No chest pain or dyspnea. Only complaint is arthritis. No dizziness, syncope or presyncope. Past Medical History:   has a past medical history of Arthritis, CAD (coronary artery disease), Carpal tunnel syndrome, Cataract, Gastric ulcer, GERD (gastroesophageal reflux disease), Hyperlipidemia, Hypertension, and Peptic ulcer. Surgical History:   has a past surgical history that includes Hysterectomy; Cataract removal; and laminectomy. Social History:   reports that she quit smoking about 36 years ago. She has never used smokeless tobacco. She reports current alcohol use. She reports that she does not use drugs. Family History:  family history includes Other in her brother and sister; Stroke in her father. Home Medications:  Outpatient Encounter Medications as of 8/9/2021   Medication Sig Dispense Refill    hydroCHLOROthiazide (HYDRODIURIL) 25 MG tablet TAKE 1 TABLET BY MOUTH DAILY. 90 tablet 4    enalapril (VASOTEC) 5 MG tablet Take 1 tablet by mouth 2 times daily 180 tablet 4    amLODIPine (NORVASC) 5 MG tablet Take 1 tablet by mouth daily 90 tablet 4    carvedilol (COREG) 3.125 MG tablet Take 1 tablet by mouth 2 times daily (with meals) 180 tablet 4    pravastatin (PRAVACHOL) 40 MG tablet TAKE 1 TABLET DAILY 90 tablet 3    pantoprazole (PROTONIX) 40 MG tablet Take 40 mg by mouth daily      acetaminophen (TYLENOL ARTHRITIS PAIN) 650 MG CR tablet Take 650 mg by mouth every 8 hours as needed for Pain      warfarin (COUMADIN) 3 MG tablet Take 3 mg by mouth See Admin Instructions.  Dose adjusted by [facility] Co[facility] clinic       No facility-administered encounter medications on file as of 8/9/2021. Allergies:  Patient has no known allergies. [x] Medications and dosages reviewed. ROS:  [x]Full ROS obtained and negative except as mentioned in HPI    Physical Examination:    Vitals:    08/09/21 0939   BP: 110/60   Pulse: (!) 47   SpO2: 99%      /60 (Site: Left Upper Arm, Position: Sitting, Cuff Size: Medium Adult)   Pulse (!) 47   Ht 4' 11\" (1.499 m)   Wt 114 lb 6.4 oz (51.9 kg)   SpO2 99%   BMI 23.11 kg/m²     · GENERAL: Well developed, well nourished, No acute distress  · NEUROLOGICAL: Alert and oriented  · PSYCH: Calm affect  · SKIN: Warm and dry, no rash  · HEENT: Normocephalic, Sclera non-icteric, mucus membranes moist  · NECK: supple, JVP normal  · CAROTID: Normal upstroke, no bruits  · CARDIAC: Normal PMI, irregular bradycardic rhythm, normal S1S2, No murmur, rub, or gallop  · RESPIRATORY: Normal respiratory effort,   · EXTREMITIES: No LE edema  · MUSCULOSKELETAL: No joint swelling or tenderness, no chest wall tenderness  · GASTROINTESTINAL: normal bowel sounds, soft, non-tender, no bruit    EKG: afib, septal infarct RBBB    Assessment:     Mixed hyperlipidemia   Controlled,  LDL 61, Continue Pravastatin. Essential hypertension, benign  /60 (Site: Left Upper Arm, Position: Sitting, Cuff Size: Medium Adult)   Pulse (!) 47   Ht 4' 11\" (1.499 m)   Wt 114 lb 6.4 oz (51.9 kg)   SpO2 99%   BMI 23.11 kg/m²   Well controlled. Stop Coreg due to bradycardia    Atrial fibrillation  Anticoagulation with coumadin  Followed through coumadin clinic. Continue coumadin  Stop coreg due to bradycardia    Coronary atherosclerosis of native coronary artery  Cath 2000> 50% LAD  January 2011 GXT Myoview> stable, normal EF  Asymptomatic. Stable. Continue medical Rx. Bradycardia:  HR=50, asymptomatic  D/c coreg    Plan:  D/c coreg (Labs good 6/2021-Reviewed chem, CBC and lipids)  F/u March.      Thank you for allowing me to participate in the care of this individual.      Conchis Vega M.D., [facility]

## 2021-08-09 NOTE — TELEPHONE ENCOUNTER
Called patient. She just had bottle of Coreg shipped from mail order. When she gets it she will set it aside. [facility] discontinued Coreg today for bradycardia.

## 2021-08-16 ENCOUNTER — TELEPHONE (OUTPATIENT)
Dept: CARDIOLOGY CLINIC | Age: 86
End: 2021-08-16

## 2021-08-16 NOTE — TELEPHONE ENCOUNTER
Pt states she has been fully vaccinated and is asking if she would need the booster that is being talked about. Please call to advise.

## 2021-09-01 ENCOUNTER — ANTI-COAG VISIT (OUTPATIENT)
Dept: PHARMACY | Age: 86
End: 2021-09-01
Payer: MEDICARE

## 2021-09-01 DIAGNOSIS — I48.91 ATRIAL FIBRILLATION, UNSPECIFIED TYPE (HCC): Primary | ICD-10-CM

## 2021-09-01 LAB — INTERNATIONAL NORMALIZATION RATIO, POC: 2.3

## 2021-09-01 PROCEDURE — 85610 PROTHROMBIN TIME: CPT

## 2021-09-01 PROCEDURE — 99211 OFF/OP EST MAY X REQ PHY/QHP: CPT

## 2021-09-01 NOTE — PROGRESS NOTES
Ms. Zaki Luna is a 80 y.o. y/o female with history of Afib   She presents today for anticoagulation monitoring and adjustment. Pertinent PMH: HTN, HLD, CAD    Patient Reported Findings:  Yes     No  [x]   []       Patient verifies current dosing regimen as listed  []   [x]       S/S bleeding/bruising/swelling/SOB- denies   []   [x]       Blood in urine or stool- denies   []   [x]       Procedures scheduled in the future at this time  []   [x]       Missed Dose - denies   []   [x]       Extra Dose- denies   []   [x]       Change in medications- coreg dec---> no changes---> stopped coreg      []   [x]       Change in health/diet/appetite states that she has been eating salads and green beans--> no greens with stress, will go back to eating salads 1-2 times/week---> states had greens twice weekly --> no changes, no NVD---> maybe less greens, no NVD---> had 2 salads --> no changes, no NVD  []   [x]       Change in alcohol use- doesn't drink   []   [x]       Change in activity  []   [x]       Hospital admission  []   [x]       Emergency department visit  []   [x]       Other complaints  Patient continues to be stressed with taking care of her 81 y/o brother who was just placed in a nursing home. Her younger sister has passed away in the end of December 2018 from Alzheimer's. Nephew needs a liver transplant. Clinical Outcomes:  Yes     No  []   [x]       Major bleeding event  []   [x]       Thromboembolic event    Duration of warfarin Therapy: Indefinitely   INR Range:  2.0-3.0     INR is 2.3 today   Continue 3mg Sun and Thurs and 1.5mg all other days. Encouraged to maintain a consistency of vegetables/salads. RF called into [facility].   Recheck INR in 6 weeks on 10/13    Referring cardiologist is Dr. Jayme Santizo.  INR (no units)   Date Value   09/01/2021 2.3   07/21/2021 2.5   07/07/2021 3.2   05/27/2021 3.2   04/15/2020 2.60 (H)   09/11/2018 2.9   07/31/2018 2.2   06/19/2018 2.3   For Pharmacy Admin Tracking Only     Intervention Detail: Refill(s) Provided   Total # of Interventions Recommended: 1   Total # of Interventions Accepted: 1   Time Spent (min): [address], [facility], PharmD

## 2021-10-13 ENCOUNTER — ANTI-COAG VISIT (OUTPATIENT)
Dept: PHARMACY | Age: 86
End: 2021-10-13
Payer: MEDICARE

## 2021-10-13 DIAGNOSIS — I48.91 ATRIAL FIBRILLATION, UNSPECIFIED TYPE (HCC): Primary | ICD-10-CM

## 2021-10-13 LAB — INTERNATIONAL NORMALIZATION RATIO, POC: 1.9

## 2021-10-13 PROCEDURE — 85610 PROTHROMBIN TIME: CPT

## 2021-10-13 PROCEDURE — 99211 OFF/OP EST MAY X REQ PHY/QHP: CPT

## 2021-10-13 NOTE — PROGRESS NOTES
Ms. Aviva Shoemaker is a 80 y.o. y/o female with history of Afib   She presents today for anticoagulation monitoring and adjustment. Pertinent PMH: HTN, HLD, CAD    Patient Reported Findings:  Yes     No  [x]   []       Patient verifies current dosing regimen as listed  []   [x]       S/S bleeding/bruising/swelling/SOB- denies   []   [x]       Blood in urine or stool- denies   []   [x]       Procedures scheduled in the future at this time  []   [x]       Missed Dose - denies   []   [x]       Extra Dose- denies   []   [x]       Change in medications- coreg dec---> no changes---> stopped coreg      []   [x]       Change in health/diet/appetite states that she has been eating salads and green beans--> no greens with stress, will go back to eating salads 1-2 times/week---> states had greens twice weekly --> no changes, no NVD---> maybe less greens, no NVD---> had 2 salads --> no changes, no NVD   []   [x]       Change in alcohol use- doesn't drink   []   [x]       Change in activity  []   [x]       Hospital admission  []   [x]       Emergency department visit  []   [x]       Other complaints  Patient continues to be stressed with taking care of her 81 y/o brother who was just placed in a nursing home. Her younger sister has passed away in the end of December 2018 from Alzheimer's. Nephew needs a liver transplant. Clinical Outcomes:  Yes     No  []   [x]       Major bleeding event  []   [x]       Thromboembolic event    Duration of warfarin Therapy: Indefinitely   INR Range:  2.0-3.0     INR is 1.9 today, 10/13   Take 4.5 mg tomorrow only and then continue 3mg Sun and Thurs and 1.5mg all other days. Encouraged to maintain a consistency of vegetables/salads.     Recheck INR in 6 weeks on 11/24     Referring cardiologist is Dr. Annette Bennett.  INR (no units)   Date Value   09/01/2021 2.3   07/21/2021 2.5   07/07/2021 3.2   05/27/2021 3.2   04/15/2020 2.60 (H)   09/11/2018 2.9   07/31/2018 2.2   06/19/2018 2.3   For Pharmacy Admin Tracking Only     Intervention Detail: Dose Adjustment: 1, reason: Therapy Optimization   Total # of Interventions Recommended: 1   Total # of Interventions Accepted: 1   Time Spent (min): [address] [place] [address] [place], [address] [facility], PharmD

## 2021-11-18 ENCOUNTER — TELEPHONE (OUTPATIENT)
Dept: CARDIOLOGY CLINIC | Age: 86
End: 2021-11-18

## 2021-11-18 NOTE — TELEPHONE ENCOUNTER
Asking to speak to DB RN to see if [facility] thinks she should get the covid booster ? She hopes he doesn't want her to get it , the last time she asked him he said no .

## 2021-11-24 ENCOUNTER — ANTI-COAG VISIT (OUTPATIENT)
Dept: PHARMACY | Age: 86
End: 2021-11-24
Payer: MEDICARE

## 2021-11-24 DIAGNOSIS — I48.91 ATRIAL FIBRILLATION, UNSPECIFIED TYPE (HCC): Primary | ICD-10-CM

## 2021-11-24 LAB — INTERNATIONAL NORMALIZATION RATIO, POC: 2.2

## 2021-11-24 PROCEDURE — 85610 PROTHROMBIN TIME: CPT

## 2021-11-24 PROCEDURE — 99211 OFF/OP EST MAY X REQ PHY/QHP: CPT

## 2021-11-24 NOTE — PROGRESS NOTES
Ms. Diannah Brunner is a 80 y.o. y/o female with history of Afib   She presents today for anticoagulation monitoring and adjustment. Pertinent PMH: HTN, HLD, CAD    Patient Reported Findings:  Yes     No  [x]   []       Patient verifies current dosing regimen as listed  []   [x]       S/S bleeding/bruising/swelling/SOB- denies   []   [x]       Blood in urine or stool- denies   []   [x]       Procedures scheduled in the future at this time  []   [x]       Missed Dose - denies   []   [x]       Extra Dose- denies   []   [x]       Change in medications- coreg dec---> no changes---> stopped coreg---> no changes       []   [x]       Change in health/diet/appetite states that she has been eating salads and green beans--> no greens with stress, will go back to eating salads 1-2 times/week---> states had greens twice weekly --> no changes, no NVD---> maybe less greens, no NVD---> had 2 salads --> no changes, no NVD   []   [x]       Change in alcohol use- doesn't drink   []   [x]       Change in activity  []   [x]       Hospital admission  []   [x]       Emergency department visit  []   [x]       Other complaints  Patient continues to be stressed with taking care of her 79 y/o brother who was just placed in a nursing home. Her younger sister has passed away in the end of December 2018 from Alzheimer's. Nephew needs a liver transplant. Clinical Outcomes:  Yes     No  []   [x]       Major bleeding event  []   [x]       Thromboembolic event    Duration of warfarin Therapy: Indefinitely   INR Range:  2.0-3.0     INR is 2.2 today   Continue 3mg Sun and Thurs and 1.5mg all other days. Encouraged to maintain a consistency of vegetables/salads. RF called into [facility].   Recheck INR in 6 weeks on 1/5    **consent form signed 11/24/2021     Referring cardiologist is Dr. Katharine Ganser.  INR (no units)   Date Value   11/24/2021 2.2   10/13/2021 1.9   09/01/2021 2.3   07/21/2021 2.5   04/15/2020 2.60 (H)   09/11/2018 2.9   07/31/2018 2.2 06/19/2018 2.3   For Pharmacy Admin Tracking Only     Intervention Detail: Refill(s) Provided   Total # of Interventions Recommended: 1   Total # of Interventions Accepted: 1   Time Spent (min): [address] [facility], PharmD

## 2021-12-29 ENCOUNTER — TELEPHONE (OUTPATIENT)
Dept: CARDIOLOGY CLINIC | Age: 86
End: 2021-12-29

## 2021-12-29 DIAGNOSIS — E78.2 MIXED HYPERLIPIDEMIA: Chronic | ICD-10-CM

## 2021-12-29 DIAGNOSIS — I10 ESSENTIAL HYPERTENSION, BENIGN: Chronic | ICD-10-CM

## 2021-12-29 RX ORDER — AMLODIPINE BESYLATE 5 MG/1
5 TABLET ORAL DAILY
Qty: 90 TABLET | Refills: 4 | Status: SHIPPED | OUTPATIENT
Start: 2021-12-29

## 2021-12-29 RX ORDER — PRAVASTATIN SODIUM 40 MG
TABLET ORAL
Qty: 90 TABLET | Refills: 4 | Status: SHIPPED | OUTPATIENT
Start: 2021-12-29

## 2021-12-29 RX ORDER — ENALAPRIL MALEATE 5 MG/1
5 TABLET ORAL 2 TIMES DAILY
Qty: 180 TABLET | Refills: 4 | Status: SHIPPED | OUTPATIENT
Start: 2021-12-29

## 2021-12-29 RX ORDER — HYDROCHLOROTHIAZIDE 25 MG/1
TABLET ORAL
Qty: 90 TABLET | Refills: 4 | Status: SHIPPED | OUTPATIENT
Start: 2021-12-29

## 2021-12-29 NOTE — TELEPHONE ENCOUNTER
Pt calling requesting to speak to Gypsy Richardson about medication. Very adamant on speaking to her.

## 2021-12-29 NOTE — TELEPHONE ENCOUNTER
Patient called back. Needs refills of all cardiac meds. Refilled HCTZ, Norvasc, Pravastatin, Vasotec to [facility].

## 2022-01-05 ENCOUNTER — ANTI-COAG VISIT (OUTPATIENT)
Dept: PHARMACY | Age: 87
End: 2022-01-05
Payer: MEDICARE

## 2022-01-05 DIAGNOSIS — I48.91 ATRIAL FIBRILLATION, UNSPECIFIED TYPE (HCC): Primary | ICD-10-CM

## 2022-01-05 LAB — INTERNATIONAL NORMALIZATION RATIO, POC: 2

## 2022-01-05 PROCEDURE — 85610 PROTHROMBIN TIME: CPT

## 2022-01-05 PROCEDURE — 99211 OFF/OP EST MAY X REQ PHY/QHP: CPT

## 2022-01-05 NOTE — PROGRESS NOTES
Ms. Lee Patient is a 80 y.o. y/o female with history of Afib   She presents today for anticoagulation monitoring and adjustment. Pertinent PMH: HTN, HLD, CAD    Patient Reported Findings:  Yes     No  [x]   []       Patient verifies current dosing regimen as listed  []   [x]       S/S bleeding/bruising/swelling/SOB- denies   []   [x]       Blood in urine or stool- denies   []   [x]       Procedures scheduled in the future at this time  []   [x]       Missed Dose - Denies   []   [x]       Extra Dose- Denies   []   [x]       Change in medications- coreg dec---> no changes---> stopped coreg---> no changes       []   [x]       Change in health/diet/appetite states that she has been eating salads and green beans--> no greens with stress, will go back to eating salads 1-2 times/week---> states had greens twice weekly --> no changes, no NVD---> maybe less greens, no NVD---> had 2 salads --> no changes, no NVD  []   [x]       Change in alcohol use- doesn't drink   []   [x]       Change in activity  []   [x]       Hospital admission  []   [x]       Emergency department visit  []   [x]       Other complaints  Patient continues to be stressed with taking care of her 81 y/o brother who was just placed in a nursing home. Her younger sister has passed away in the end of December 2018 from Alzheimer's. Nephew needs a liver transplant. Clinical Outcomes:  Yes     No  []   [x]       Major bleeding event  []   [x]       Thromboembolic event    Duration of warfarin Therapy: Indefinitely   INR Range:  2.0-3.0     INR is 2.0 today   Continue 3mg Sun and Thurs and 1.5mg all other days. Encouraged to maintain a consistency of vegetables/salads.     Recheck INR in 6 weeks on 2/16    **consent form signed 11/24/2021     Referring cardiologist is Dr. Grover Khan.  INR (no units)   Date Value   01/05/2022 2.0   11/24/2021 2.2   10/13/2021 1.9   09/01/2021 2.3   04/15/2020 2.60 (H)   09/11/2018 2.9   07/31/2018 2.2   06/19/2018 2.3   For Pharmacy Admin Tracking Only     Total # of Interventions Recommended: 0   Total # of Interventions Accepted: 0   Time Spent (min): [address] [facility], PharmD

## 2022-02-16 ENCOUNTER — ANTI-COAG VISIT (OUTPATIENT)
Dept: PHARMACY | Age: 87
End: 2022-02-16
Payer: MEDICARE

## 2022-02-16 DIAGNOSIS — I48.91 ATRIAL FIBRILLATION, UNSPECIFIED TYPE (HCC): Primary | ICD-10-CM

## 2022-02-16 LAB — INTERNATIONAL NORMALIZATION RATIO, POC: 2.3

## 2022-02-16 PROCEDURE — 99211 OFF/OP EST MAY X REQ PHY/QHP: CPT

## 2022-02-16 PROCEDURE — 85610 PROTHROMBIN TIME: CPT

## 2022-02-16 NOTE — PROGRESS NOTES
Ms. Shivam Duran is a 80 y.o. y/o female with history of Afib   She presents today for anticoagulation monitoring and adjustment. Pertinent PMH: HTN, HLD, CAD    Patient Reported Findings:  Yes     No  [x]   []       Patient verifies current dosing regimen as listed  []   [x]       S/S bleeding/bruising/swelling/SOB- denies   []   [x]       Blood in urine or stool- denies   []   [x]       Procedures scheduled in the future at this time  []   [x]       Missed Dose - Denies   []   [x]       Extra Dose- Denies   []   [x]       Change in medications- coreg dec---> no changes---> stopped coreg---> no changes       []   [x]       Change in health/diet/appetite states that she has been eating salads and green beans--> no greens with stress, will go back to eating salads 1-2 times/week---> states had greens twice weekly --> had 2 salads --> no changes, no NVD  []   [x]       Change in alcohol use- doesn't drink   []   [x]       Change in activity  []   [x]       Hospital admission  []   [x]       Emergency department visit  []   [x]       Other complaints  Patient continues to be stressed with taking care of her 81 y/o brother who was just placed in a nursing home. Her younger sister has passed away in the end of December 2018 from Alzheimer's. Nephew needs a liver transplant. Clinical Outcomes:  Yes     No  []   [x]       Major bleeding event  []   [x]       Thromboembolic event    Duration of warfarin Therapy: Indefinitely   INR Range:  2.0-3.0     INR is 2.3 today   Continue 3mg Sun and Thurs and 1.5mg all other days. Encouraged to maintain a consistency of vegetables/salads.     Recheck INR in 6 weeks on 3/30    **consent form signed 11/24/2021     Referring cardiologist is Dr. Warren Nash.  INR (no units)   Date Value   01/05/2022 2.0   11/24/2021 2.2   10/13/2021 1.9   09/01/2021 2.3   04/15/2020 2.60 (H)   09/11/2018 2.9   07/31/2018 2.2   06/19/2018 2.3     For Pharmacy Admin Tracking Only     Total # of Interventions Recommended: 0   Total # of Interventions Accepted: 0   Time Spent (min): Yusuf Gusman, [facility], PharmD

## 2022-03-30 ENCOUNTER — ANTI-COAG VISIT (OUTPATIENT)
Dept: PHARMACY | Age: 87
End: 2022-03-30
Payer: MEDICARE

## 2022-03-30 DIAGNOSIS — I48.91 ATRIAL FIBRILLATION, UNSPECIFIED TYPE (HCC): Primary | ICD-10-CM

## 2022-03-30 LAB — INTERNATIONAL NORMALIZATION RATIO, POC: 2.3

## 2022-03-30 PROCEDURE — 99211 OFF/OP EST MAY X REQ PHY/QHP: CPT

## 2022-03-30 PROCEDURE — 85610 PROTHROMBIN TIME: CPT

## 2022-03-30 NOTE — PROGRESS NOTES
Ms. Dewayne Carrero is a 80 y.o. y/o female with history of Afib   She presents today for anticoagulation monitoring and adjustment. Pertinent PMH: HTN, HLD, CAD    Patient Reported Findings:  Yes     No  [x]   []       Patient verifies current dosing regimen as listed  []   [x]       S/S bleeding/bruising/swelling/SOB- denies   []   [x]       Blood in urine or stool- denies   []   [x]       Procedures scheduled in the future at this time  []   [x]       Missed Dose - Denies   []   [x]       Extra Dose- Denies   []   [x]       Change in medications- coreg dec---> no changes---> stopped coreg---> no changes       []   [x]       Change in health/diet/appetite states that she has been eating salads and green beans--> no greens with stress, will go back to eating salads 1-2 times/week---> states had greens twice weekly --> had 2 salads --> no changes, no NVD  []   [x]       Change in alcohol use- doesn't drink   []   [x]       Change in activity  []   [x]       Hospital admission  []   [x]       Emergency department visit  []   [x]       Other complaints  Patient continues to be stressed with taking care of her 79 y/o brother who was just placed in a nursing home. Her younger sister has passed away in the end of December 2018 from Alzheimer's. Nephew needs a liver transplant. Clinical Outcomes:  Yes     No  []   [x]       Major bleeding event  []   [x]       Thromboembolic event    Duration of warfarin Therapy: Indefinitely   INR Range:  2.0-3.0     INR is 2.3 again today   Continue 3mg Sun and Thurs and 1.5mg all other days. Encouraged to maintain a consistency of vegetables/salads. RF called into [facility].   Recheck INR in 6 weeks on 5/11    **consent form signed 11/24/2021     Referring cardiologist is Dr. Dong Khan.  INR (no units)   Date Value   03/30/2022 2.3   02/16/2022 2.3   01/05/2022 2.0   11/24/2021 2.2   04/15/2020 2.60 (H)   09/11/2018 2.9   07/31/2018 2.2   06/19/2018 2.3   For Pharmacy Admin Tracking Only     Intervention Detail: Refill(s) Provided   Total # of Interventions Recommended: 1   Total # of Interventions Accepted: 1   Time Spent (min): [address], [address] [facility], PharmD

## 2022-03-30 NOTE — TELEPHONE ENCOUNTER
Warfarin prescription phoned into [facility] 24 to [address] [facility] under Dr. Elicia Jose  Warfarin 3 mg tabs  Take 3 mg (3 mg x 1) every Sun, Thu; 1.5 mg (3 mg x 0.5) all other days  90 days   2 refills    Sera Segal, BingD, [facility]

## 2022-05-11 ENCOUNTER — ANTI-COAG VISIT (OUTPATIENT)
Dept: PHARMACY | Age: 87
End: 2022-05-11
Payer: MEDICARE

## 2022-05-11 DIAGNOSIS — I48.91 ATRIAL FIBRILLATION, UNSPECIFIED TYPE (HCC): Primary | ICD-10-CM

## 2022-05-11 LAB — INTERNATIONAL NORMALIZATION RATIO, POC: 2.1

## 2022-05-11 PROCEDURE — 99211 OFF/OP EST MAY X REQ PHY/QHP: CPT

## 2022-05-11 PROCEDURE — 85610 PROTHROMBIN TIME: CPT

## 2022-05-11 NOTE — PROGRESS NOTES
Ms. Nell Gan is a 80 y.o. y/o female with history of Afib   She presents today for anticoagulation monitoring and adjustment. Pertinent PMH: HTN, HLD, CAD    Patient Reported Findings:  Yes     No  [x]   []       Patient verifies current dosing regimen as listed  []   [x]       S/S bleeding/bruising/swelling/SOB- denies   []   [x]       Blood in urine or stool- denies   []   [x]       Procedures scheduled in the future at this time  []   [x]       Missed Dose - Denies   []   [x]       Extra Dose- Denies   []   [x]       Change in medications- coreg dec---> no changes---> stopped coreg---> no changes       []   [x]       Change in health/diet/appetite states that she has been eating salads and green beans--> no greens with stress, will go back to eating salads 1-2 times/week---> states had greens twice weekly --> had 2 salads --> no changes, no NVD  []   [x]       Change in alcohol use- doesn't drink   []   [x]       Change in activity  []   [x]       Hospital admission  []   [x]       Emergency department visit  []   [x]       Other complaints  Patient continues to be stressed with taking care of her 79 y/o brother who was just placed in a nursing home. Her younger sister has passed away in the end of December 2018 from Alzheimer's. Nephew needs a liver transplant. Clinical Outcomes:  Yes     No  []   [x]       Major bleeding event  []   [x]       Thromboembolic event    Duration of warfarin Therapy: Indefinitely   INR Range:  2.0-3.0     INR is 2.1 today   Continue 3mg Sun and Thurs and 1.5mg all other days. Encouraged to maintain a consistency of vegetables/salads.     Recheck INR in 6 weeks on 6/22    **consent form signed 11/24/2021     Referring cardiologist is Dr. Linda Worrell.  INR (no units)   Date Value   05/11/2022 2.1   03/30/2022 2.3   02/16/2022 2.3   01/05/2022 2.0   04/15/2020 2.60 (H)   09/11/2018 2.9   07/31/2018 2.2   06/19/2018 2.3   For Pharmacy Admin Tracking Only     Total # of Interventions Recommended: 0   Total # of Interventions Accepted: 0   Time Spent (min): [address], [facility], PharmD

## 2022-05-17 ENCOUNTER — TELEPHONE (OUTPATIENT)
Dept: CARDIOLOGY CLINIC | Age: 87
End: 2022-05-17

## 2022-05-17 ENCOUNTER — OFFICE VISIT (OUTPATIENT)
Dept: CARDIOLOGY CLINIC | Age: 87
End: 2022-05-17
Payer: MEDICARE

## 2022-05-17 ENCOUNTER — HOSPITAL ENCOUNTER (OUTPATIENT)
Age: 87
Discharge: HOME | End: 2022-05-17
Payer: MEDICARE

## 2022-05-17 VITALS
SYSTOLIC BLOOD PRESSURE: 118 MMHG | DIASTOLIC BLOOD PRESSURE: 60 MMHG | WEIGHT: 108.6 LBS | HEIGHT: 59 IN | BODY MASS INDEX: 21.89 KG/M2 | OXYGEN SATURATION: 99 % | HEART RATE: 73 BPM

## 2022-05-17 DIAGNOSIS — I25.10 ATHEROSCLEROSIS OF NATIVE CORONARY ARTERY OF NATIVE HEART WITHOUT ANGINA PECTORIS: Chronic | ICD-10-CM

## 2022-05-17 DIAGNOSIS — I10 ESSENTIAL HYPERTENSION, BENIGN: Chronic | ICD-10-CM

## 2022-05-17 DIAGNOSIS — R00.1 BRADYCARDIA: Primary | ICD-10-CM

## 2022-05-17 DIAGNOSIS — I48.91 ATRIAL FIBRILLATION, UNSPECIFIED TYPE (HCC): Chronic | ICD-10-CM

## 2022-05-17 DIAGNOSIS — E78.2 MIXED HYPERLIPIDEMIA: Chronic | ICD-10-CM

## 2022-05-17 LAB
ALT SERPL-CCNC: 10 U/L (ref 10–40)
ANION GAP SERPL CALCULATED.3IONS-SCNC: 11 MMOL/L (ref 3–16)
AST SERPL-CCNC: 21 U/L (ref 15–37)
BUN BLDV-MCNC: 36 MG/DL (ref 7–20)
CALCIUM SERPL-MCNC: 9.8 MG/DL (ref 8.3–10.6)
CHLORIDE BLD-SCNC: 99 MMOL/L (ref 99–110)
CHOLESTEROL, FASTING: 164 MG/DL (ref 0–199)
CO2: 27 MMOL/L (ref 21–32)
CREAT SERPL-MCNC: 1 MG/DL (ref 0.6–1.2)
GFR AFRICAN AMERICAN: >60
GFR NON-AFRICAN AMERICAN: 52
GLUCOSE BLD-MCNC: 66 MG/DL (ref 70–99)
HCT VFR BLD CALC: 37.3 % (ref 36–48)
HDLC SERPL-MCNC: 84 MG/DL (ref 40–60)
HEMOGLOBIN: 12.4 G/DL (ref 12–16)
LDL CHOLESTEROL CALCULATED: 67 MG/DL
MCH RBC QN AUTO: 28.1 PG (ref 26–34)
MCHC RBC AUTO-ENTMCNC: 33.1 G/DL (ref 31–36)
MCV RBC AUTO: 84.7 FL (ref 80–100)
PDW BLD-RTO: 15.6 % (ref 12.4–15.4)
PLATELET # BLD: 250 K/UL (ref 135–450)
PMV BLD AUTO: 8.6 FL (ref 5–10.5)
POTASSIUM SERPL-SCNC: 4.3 MMOL/L (ref 3.5–5.1)
RBC # BLD: 4.41 M/UL (ref 4–5.2)
SODIUM BLD-SCNC: 137 MMOL/L (ref 136–145)
TRIGLYCERIDE, FASTING: 65 MG/DL (ref 0–150)
VLDLC SERPL CALC-MCNC: 13 MG/DL
WBC # BLD: 5.5 K/UL (ref 4–11)

## 2022-05-17 PROCEDURE — 84450 TRANSFERASE (AST) (SGOT): CPT

## 2022-05-17 PROCEDURE — 80061 LIPID PANEL: CPT

## 2022-05-17 PROCEDURE — 85027 COMPLETE CBC AUTOMATED: CPT

## 2022-05-17 PROCEDURE — 36415 COLL VENOUS BLD VENIPUNCTURE: CPT

## 2022-05-17 PROCEDURE — 84460 ALANINE AMINO (ALT) (SGPT): CPT

## 2022-05-17 PROCEDURE — 99214 OFFICE O/P EST MOD 30 MIN: CPT | Performed by: INTERNAL MEDICINE

## 2022-05-17 PROCEDURE — 80048 BASIC METABOLIC PNL TOTAL CA: CPT

## 2022-05-17 NOTE — TELEPHONE ENCOUNTER
Pt returned call to office and message relayed per [facility] with verbal understanding and encouraged to call office with any other questions or concerns.

## 2022-05-17 NOTE — TELEPHONE ENCOUNTER
----- Message from Chayito Chauhan MD sent at 5/17/2022  3:59 PM EDT -----  Tell pt. their labs are good. F/u in clinic as planned.       1 [facility]

## 2022-05-17 NOTE — PROGRESS NOTES
[address]  Cardiac Follow Up     Referring Provider:  Carlita Bradshaw MD     Chief Complaint   Patient presents with    6 Month Follow-Up    Coronary Artery Disease    Hypertension    Atrial Fibrillation        History of Present Illness:  Ms. Monica Garcia is here today for regular follow up of her AF, CAD, HTN, hyperlipidemia. She continues to do well from a cardiac standpoint. Regular INRs in coumadin clinic. Past Medical History:   has a past medical history of Arthritis, CAD (coronary artery disease), Carpal tunnel syndrome, Cataract, Gastric ulcer, GERD (gastroesophageal reflux disease), Hyperlipidemia, Hypertension, and Peptic ulcer. Surgical History:   has a past surgical history that includes Hysterectomy; Cataract removal; and laminectomy. Social History:   reports that she quit smoking about 37 years ago. She has never used smokeless tobacco. She reports current alcohol use. She reports that she does not use drugs. Family History:  family history includes Other in her brother and sister; Stroke in her father. Home Medications:  Outpatient Encounter Medications as of 5/17/2022   Medication Sig Dispense Refill    pravastatin (PRAVACHOL) 40 MG tablet TAKE 1 TABLET DAILY 90 tablet 4    amLODIPine (NORVASC) 5 MG tablet Take 1 tablet by mouth daily 90 tablet 4    enalapril (VASOTEC) 5 MG tablet Take 1 tablet by mouth 2 times daily 180 tablet 4    hydroCHLOROthiazide (HYDRODIURIL) 25 MG tablet TAKE 1 TABLET BY MOUTH DAILY. 90 tablet 4    pantoprazole (PROTONIX) 40 MG tablet Take 40 mg by mouth daily      acetaminophen (TYLENOL ARTHRITIS PAIN) 650 MG CR tablet Take 650 mg by mouth every 8 hours as needed for Pain      warfarin (COUMADIN) 3 MG tablet Take 3 mg by mouth See Admin Instructions. Dose adjusted by [facility] clinic       No facility-administered encounter medications on file as of 5/17/2022. Allergies:  Patient has no known allergies.      [x] Medications and dosages reviewed. ROS:  [x]Full ROS obtained and negative except as mentioned in HPI    Physical Examination:    Vitals:    05/17/22 0947   BP: 118/60   Pulse: 73   SpO2: 99%      /60 (Site: Left Upper Arm, Position: Sitting, Cuff Size: Medium Adult)   Pulse 73   Ht 4' 11\" (1.499 m)   Wt 108 lb 9.6 oz (49.3 kg)   SpO2 99%   BMI 21.93 kg/m²     · GENERAL: Elderly frail female in No acute distress  · NEUROLOGICAL: Alert and oriented  · PSYCH: Calm affect  · SKIN: Warm and dry, no rash  · HEENT: Normocephalic, Sclera non-icteric, mucus membranes moist  · NECK: supple, JVP normal  · CAROTID: Normal upstroke, no bruits  · CARDIAC: Normal PMI, irregular bradycardic rhythm, normal S1S2, No murmur, rub, or gallop  · RESPIRATORY: Normal respiratory effort,   · EXTREMITIES: No LE edema  · MUSCULOSKELETAL: No joint swelling or tenderness, no chest wall tenderness  · GASTROINTESTINAL: normal bowel sounds, soft, non-tender, no bruit        ASSESSMENT:    Mixed hyperlipidemia   Controlled,  LDL 61, Continue Pravastatin. Recheck labs today    Essential hypertension, benign  /60 (Site: Left Upper Arm, Position: Sitting, Cuff Size: Medium Adult)   Pulse 73   Ht 4' 11\" (1.499 m)   Wt 108 lb 9.6 oz (49.3 kg)   SpO2 99%   BMI 21.93 kg/m²   Well controlled. Continue norvasc and enalapril     Atrial fibrillation  Anticoagulation with coumadin  Followed through coumadin clinic. Continue coumadin      Coronary atherosclerosis of native coronary artery  Cath 2000> 50% LAD  January 2011 GXT Myoview> stable, normal EF  Asymptomatic. Stable. Continue medical Rx.     Bradycardia:  Resolved off of coreg    Plan:  Stable cardiac status  Check labs  F/u 1 year    Thank you for allowing me to participate in the care of this individual.      Bentley Mckeon M.D., [facility]

## 2022-06-22 ENCOUNTER — ANTI-COAG VISIT (OUTPATIENT)
Dept: PHARMACY | Age: 87
End: 2022-06-22
Payer: MEDICARE

## 2022-06-22 DIAGNOSIS — I48.91 ATRIAL FIBRILLATION, UNSPECIFIED TYPE (HCC): Primary | ICD-10-CM

## 2022-06-22 LAB — INTERNATIONAL NORMALIZATION RATIO, POC: 2.5

## 2022-06-22 PROCEDURE — 85610 PROTHROMBIN TIME: CPT

## 2022-06-22 PROCEDURE — 99211 OFF/OP EST MAY X REQ PHY/QHP: CPT

## 2022-06-22 NOTE — PROGRESS NOTES
Ms. Alessandro Chilel is a 80 y.o. y/o female with history of Afib   She presents today for anticoagulation monitoring and adjustment. Pertinent PMH: HTN, HLD, CAD    Patient Reported Findings:  Yes     No  [x]   []       Patient verifies current dosing regimen as listed  []   [x]       S/S bleeding/bruising/swelling/SOB- denies   []   [x]       Blood in urine or stool- denies   []   [x]       Procedures scheduled in the future at this time  []   [x]       Missed Dose - Denies   []   [x]       Extra Dose- Denies   []   [x]       Change in medications- coreg dec---> no changes---> stopped coreg---> no changes       []   [x]       Change in health/diet/appetite states that she has been eating salads and green beans--> no greens with stress, will go back to eating salads 1-2 times/week---> states had greens twice weekly --> had 2 salads --> no changes, no NVD  []   [x]       Change in alcohol use- doesn't drink   []   [x]       Change in activity  []   [x]       Hospital admission  []   [x]       Emergency department visit  []   [x]       Other complaints  Patient continues to be stressed with taking care of her 79 y/o brother who was just placed in a nursing home. Her younger sister has passed away in the end of December 2018 from Alzheimer's. Nephew needs a liver transplant. Clinical Outcomes:  Yes     No  []   [x]       Major bleeding event  []   [x]       Thromboembolic event    Duration of warfarin Therapy: Indefinitely   INR Range:  2.0-3.0     INR is 2.5 today   Continue 3mg Sun and Thurs and 1.5mg all other days. Encouraged to maintain a consistency of vegetables/salads.     Recheck INR in 6 weeks on 8/3    **consent form signed 11/24/2021     Referring cardiologist is Dr. Arnol Tineo.  INR (no units)   Date Value   04/15/2020 2.60 (H)   09/11/2018 2.9   07/31/2018 2.2   06/19/2018 2.3     INR,(POC) (no units)   Date Value   06/22/2022 2.5   05/11/2022 2.1   03/30/2022 2.3   02/16/2022 2.3   For Pharmacy Admin Tracking Only     Total # of Interventions Recommended: 0   Total # of Interventions Accepted: 0   Time Spent (min): [address], [facility], PharmD

## 2022-08-03 ENCOUNTER — ANTI-COAG VISIT (OUTPATIENT)
Dept: PHARMACY | Age: 87
End: 2022-08-03
Payer: MEDICARE

## 2022-08-03 DIAGNOSIS — I48.91 ATRIAL FIBRILLATION, UNSPECIFIED TYPE (HCC): Primary | ICD-10-CM

## 2022-08-03 LAB — INTERNATIONAL NORMALIZATION RATIO, POC: 2.3

## 2022-08-03 PROCEDURE — 85610 PROTHROMBIN TIME: CPT

## 2022-08-03 PROCEDURE — 99211 OFF/OP EST MAY X REQ PHY/QHP: CPT

## 2022-08-03 NOTE — PROGRESS NOTES
Ms. Shoaib Engel is a 80 y.o. y/o female with history of Afib   She presents today for anticoagulation monitoring and adjustment. Pertinent PMH: HTN, HLD, CAD    Patient Reported Findings:  Yes     No  [x]   []       Patient verifies current dosing regimen as listed  []   [x]       S/S bleeding/bruising/swelling/SOB- denies   []   [x]       Blood in urine or stool- denies   []   [x]       Procedures scheduled in the future at this time  []   [x]       Missed Dose - Denies   []   [x]       Extra Dose- Denies   []   [x]       Change in medications- coreg dec---> no changes---> stopped coreg---> no changes       []   [x]       Change in health/diet/appetite states that she has been eating salads and green beans--> no greens with stress, will go back to eating salads 1-2 times/week---> states had greens twice weekly --> had 2 salads --> no changes, no NVD  []   [x]       Change in alcohol use- doesn't drink   []   [x]       Change in activity  []   [x]       Hospital admission  []   [x]       Emergency department visit  []   [x]       Other complaints  Patient continues to be stressed with taking care of her 79 y/o brother who was just placed in a nursing home. Her younger sister has passed away in the end of December 2018 from Alzheimer's. Nephew needs a liver transplant. Clinical Outcomes:  Yes     No  []   [x]       Major bleeding event  []   [x]       Thromboembolic event    Duration of warfarin Therapy: Indefinitely   INR Range:  2.0-3.0     INR is 2.3 today   Continue 3mg Sun and Thurs and 1.5mg all other days. Encouraged to maintain a consistency of vegetables/salads. RF called into [facility].   Recheck INR in 6 weeks on 9/14    **consent form signed 11/24/2021     Referring cardiologist is Dr. Zachery Conway.  INR (no units)   Date Value   04/15/2020 2.60 (H)   09/11/2018 2.9   07/31/2018 2.2   06/19/2018 2.3     INR,(POC) (no units)   Date Value   08/03/2022 2.3   06/22/2022 2.5   05/11/2022 2.1   03/30/2022 2.3 For Pharmacy Admin Tracking Only    Intervention Detail: Refill(s) Provided  Total # of Interventions Recommended: 1  Total # of Interventions Accepted: 1  Time Spent (min): [address] [address] [facility], PharmD

## 2022-09-14 ENCOUNTER — ANTI-COAG VISIT (OUTPATIENT)
Dept: PHARMACY | Age: 87
End: 2022-09-14
Payer: MEDICARE

## 2022-09-14 DIAGNOSIS — I48.91 ATRIAL FIBRILLATION, UNSPECIFIED TYPE (HCC): Primary | ICD-10-CM

## 2022-09-14 LAB — INTERNATIONAL NORMALIZATION RATIO, POC: 2.3

## 2022-09-14 PROCEDURE — 99211 OFF/OP EST MAY X REQ PHY/QHP: CPT

## 2022-09-14 PROCEDURE — 85610 PROTHROMBIN TIME: CPT

## 2022-09-14 NOTE — PROGRESS NOTES
Ms. Van Novoa is a 80 y.o. y/o female with history of Afib   She presents today for anticoagulation monitoring and adjustment. Pertinent PMH: HTN, HLD, CAD    Patient Reported Findings:  Yes     No  [x]   []       Patient verifies current dosing regimen as listed  []   [x]       S/S bleeding/bruising/swelling/SOB- denies   []   [x]       Blood in urine or stool- denies   []   [x]       Procedures scheduled in the future at this time  []   [x]       Missed Dose - Denies   []   [x]       Extra Dose- Denies   []   [x]       Change in medications- coreg dec---> no changes---> stopped coreg---> no changes       []   [x]       Change in health/diet/appetite states that she has been eating salads and green beans--> no greens with stress, will go back to eating salads 1-2 times/week---> states had greens twice weekly --> had 2 salads --> no changes, no NVD  []   [x]       Change in alcohol use- doesn't drink   []   [x]       Change in activity  []   [x]       Hospital admission  []   [x]       Emergency department visit  []   [x]       Other complaints  Patient continues to be stressed with taking care of her 79 y/o brother who was just placed in a nursing home. Her younger sister has passed away in the end of December 2018 from Alzheimer's. Nephew needs a liver transplant. Clinical Outcomes:  Yes     No  []   [x]       Major bleeding event  []   [x]       Thromboembolic event    Duration of warfarin Therapy: Indefinitely   INR Range:  2.0-3.0     INR is 2.3 again today   Continue 3mg Sun and Thurs and 1.5mg all other days. Encouraged to maintain a consistency of vegetables/salads.     Recheck INR in 6 weeks on 10/26    **consent form signed 11/24/2021     Referring cardiologist is Dr. Katiuska Nieves.  INR (no units)   Date Value   04/15/2020 2.60 (H)   09/11/2018 2.9   07/31/2018 2.2   06/19/2018 2.3     INR,(POC) (no units)   Date Value   09/14/2022 2.3   08/03/2022 2.3   06/22/2022 2.5   05/11/2022 2.1   For Pharmacy Admin Tracking Only  Total # of Interventions Recommended: 0  Total # of Interventions Accepted: 0  Time Spent (min): [address], [facility], PharmD

## 2022-10-26 ENCOUNTER — ANTI-COAG VISIT (OUTPATIENT)
Dept: PHARMACY | Age: 87
End: 2022-10-26
Payer: MEDICARE

## 2022-10-26 DIAGNOSIS — I48.91 ATRIAL FIBRILLATION, UNSPECIFIED TYPE (HCC): Primary | ICD-10-CM

## 2022-10-26 LAB — INTERNATIONAL NORMALIZATION RATIO, POC: 2.6

## 2022-10-26 PROCEDURE — 99211 OFF/OP EST MAY X REQ PHY/QHP: CPT

## 2022-10-26 PROCEDURE — 85610 PROTHROMBIN TIME: CPT

## 2022-10-26 NOTE — PROGRESS NOTES
Ms. Ashok Brown is a 80 y.o. y/o female with history of Afib   She presents today for anticoagulation monitoring and adjustment. Pertinent PMH: HTN, HLD, CAD    Patient Reported Findings:  Yes     No  [x]   []       Patient verifies current dosing regimen as listed  []   [x]       S/S bleeding/bruising/swelling/SOB- denies   []   [x]       Blood in urine or stool- denies   []   [x]       Procedures scheduled in the future at this time  []   [x]       Missed Dose - Denies   []   [x]       Extra Dose- Denies   []   [x]       Change in medications- no changes  []   [x]       Change in health/diet/appetite states that she has been eating salads and green beans--> no greens with stress, will go back to eating salads 1-2 times/week---> states had greens twice weekly --> had 2 salads --> no changes, no NVD  []   [x]       Change in alcohol use- doesn't drink   []   [x]       Change in activity  []   [x]       Hospital admission  []   [x]       Emergency department visit  []   [x]       Other complaints  Patient continues to be stressed with taking care of her 81 y/o brother who was just placed in a nursing home. Her younger sister has passed away in the end of December 2018 from Alzheimer's. Nephew needs a liver transplant. Clinical Outcomes:  Yes     No  []   [x]       Major bleeding event  []   [x]       Thromboembolic event    Duration of warfarin Therapy: Indefinitely   INR Range:  2.0-3.0     INR is 2.6 today   Continue 3mg Sun and Thurs and 1.5mg all other days. Encouraged to maintain a consistency of vegetables/salads.     Recheck INR in 6 weeks on 12/7    **consent form signed 11/24/2021     Referring cardiologist is Dr. Candi Bustillo.  INR (no units)   Date Value   04/15/2020 2.60 (H)   09/11/2018 2.9   07/31/2018 2.2   06/19/2018 2.3     INR,(POC) (no units)   Date Value   09/14/2022 2.3   08/03/2022 2.3   06/22/2022 2.5   05/11/2022 2.1     For Pharmacy Admin Tracking Only    Total # of Interventions Recommended: 0  Total # of Interventions Accepted: 0  Time Spent (min): [address] [facility], PharmD

## 2022-12-07 ENCOUNTER — ANTI-COAG VISIT (OUTPATIENT)
Dept: PHARMACY | Age: 87
End: 2022-12-07
Payer: MEDICARE

## 2022-12-07 DIAGNOSIS — I48.91 ATRIAL FIBRILLATION, UNSPECIFIED TYPE (HCC): Primary | ICD-10-CM

## 2022-12-07 LAB — INTERNATIONAL NORMALIZATION RATIO, POC: 3.8

## 2022-12-07 PROCEDURE — 85610 PROTHROMBIN TIME: CPT

## 2022-12-07 PROCEDURE — 99212 OFFICE O/P EST SF 10 MIN: CPT

## 2022-12-07 NOTE — PROGRESS NOTES
Ms. Maury Madden is a 80 y.o. y/o female with history of Afib   She presents today for anticoagulation monitoring and adjustment. Pertinent PMH: HTN, HLD, CAD    Patient Reported Findings:  Yes     No  [x]   []       Patient verifies current dosing regimen as listed  []   [x]       S/S bleeding/bruising/swelling/SOB- denies   []   [x]       Blood in urine or stool- denies   []   [x]       Procedures scheduled in the future at this time  []   [x]       Missed Dose - Denies   []   [x]       Extra Dose- Denies   []   [x]       Change in medications- no changes  []   [x]       Change in health/diet/appetite states that she has been eating salads and green beans--> no greens with stress, will go back to eating salads 1-2 times/week---> states had greens twice weekly --> had 2 salads --> no changes, no NVD---> had less   []   [x]       Change in alcohol use- doesn't drink   []   [x]       Change in activity  []   [x]       Hospital admission  []   [x]       Emergency department visit  []   [x]       Other complaints  Patient continues to be stressed with taking care of her 79 y/o brother who was just placed in a nursing home. Her younger sister has passed away in the end of December 2018 from Alzheimer's. Nephew needs a liver transplant. Clinical Outcomes:  Yes     No  []   [x]       Major bleeding event  []   [x]       Thromboembolic event    Duration of warfarin Therapy: Indefinitely   INR Range:  2.0-3.0     INR is 3.8 today dt less greens   Hold tonight then continue 3mg Sun and Thurs and 1.5mg all other days. Encouraged to maintain a consistency of vegetables/salads. RF called into [facility].   Recheck INR in 6 weeks on 1/18/2023    **consent form signed 11/24/2021     Referring cardiologist is Dr. Bety Zee.  INR (no units)   Date Value   04/15/2020 2.60 (H)   09/11/2018 2.9   07/31/2018 2.2   06/19/2018 2.3     INR,(POC) (no units)   Date Value   12/07/2022 3.8   10/26/2022 2.6   09/14/2022 2.3   08/03/2022 2.3 For Pharmacy Admin Tracking Only    Intervention Detail: Dose Adjustment: 1, reason: Therapy Optimization and Refill(s) Provided  Total # of Interventions Recommended: 2  Total # of Interventions Accepted: 2  Time Spent (min): Rosendo Davis [facility], PharmD

## 2023-01-18 ENCOUNTER — ANTI-COAG VISIT (OUTPATIENT)
Dept: PHARMACY | Age: 88
End: 2023-01-18
Payer: MEDICARE

## 2023-01-18 DIAGNOSIS — I48.91 ATRIAL FIBRILLATION, UNSPECIFIED TYPE (HCC): Primary | ICD-10-CM

## 2023-01-18 LAB — INTERNATIONAL NORMALIZATION RATIO, POC: 2.8

## 2023-01-18 PROCEDURE — 99211 OFF/OP EST MAY X REQ PHY/QHP: CPT

## 2023-01-18 PROCEDURE — 85610 PROTHROMBIN TIME: CPT

## 2023-01-18 NOTE — PROGRESS NOTES
Ms. Elzbieta Gutierrez is a 89 y.o. y/o female with history of Afib   She presents today for anticoagulation monitoring and adjustment.  Pertinent PMH: HTN, HLD, CAD    Patient Reported Findings:  Yes     No  [x]   []       Patient verifies current dosing regimen as listed- confirmed, follows AVS  []   [x]       S/S bleeding/bruising/swelling/SOB- denies   []   [x]       Blood in urine or stool- denies   []   [x]       Procedures scheduled in the future at this time  []   [x]       Missed Dose - Denies   []   [x]       Extra Dose- Denies   []   [x]       Change in medications- no changes  []   [x]       Change in health/diet/appetite states that she has been eating salads and green beans--> no greens with stress, will go back to eating salads 1-2 times/week---> states had greens twice weekly --> had 2 salads --> no changes, no NVD---> had less---> had a lot of salads    []   [x]       Change in alcohol use- doesn't drink   []   [x]       Change in activity  []   [x]       Hospital admission  []   [x]       Emergency department visit  []   [x]       Other complaints  Patient continues to be stressed with taking care of her 87 y/o brother who was just placed in a nursing home.  Her younger sister has passed away in the end of December 2018 from Alzheimer's.   Nephew needs a liver transplant.    Clinical Outcomes:  Yes     No  []   [x]       Major bleeding event  []   [x]       Thromboembolic event    Duration of warfarin Therapy: Indefinitely   INR Range:  2.0-3.0     INR is 2.8 today   Continue 3mg Sun and Thurs and 1.5mg all other days.  Encouraged to maintain a consistency of vegetables/salads.    Recheck INR in 6 weeks on 3/1    Consent form signed 1/18/2023    Referring cardiologist is Dr. Wagner.  INR (no units)   Date Value   04/15/2020 2.60 (H)   09/11/2018 2.9   07/31/2018 2.2   06/19/2018 2.3     INR,(POC) (no units)   Date Value   01/18/2023 2.8   12/07/2022 3.8   10/26/2022 2.6   09/14/2022 2.3     For  Pharmacy Admin Tracking Only    Total # of Interventions Recommended: 0  Total # of Interventions Accepted: 0  Time Spent (min): 15

## 2023-02-24 DIAGNOSIS — I10 ESSENTIAL HYPERTENSION, BENIGN: Chronic | ICD-10-CM

## 2023-02-24 RX ORDER — ENALAPRIL MALEATE 5 MG/1
TABLET ORAL
Qty: 180 TABLET | Refills: 3 | Status: SHIPPED | OUTPATIENT
Start: 2023-02-24

## 2023-02-24 RX ORDER — AMLODIPINE BESYLATE 5 MG/1
TABLET ORAL
Qty: 90 TABLET | Refills: 3 | Status: SHIPPED | OUTPATIENT
Start: 2023-02-24

## 2023-02-24 NOTE — TELEPHONE ENCOUNTER
Received refill request for Amlodipine and Enalapril from [facility] pharmacy.     Last ov:2022 MMK    Last EK2021    Last Refill:2021    Next appointment:2023 ANTONINA

## 2023-03-01 ENCOUNTER — APPOINTMENT (OUTPATIENT)
Dept: PHARMACY | Age: 88
End: 2023-03-01
Payer: MEDICARE

## 2023-03-01 ENCOUNTER — TELEPHONE (OUTPATIENT)
Dept: PHARMACY | Age: 88
End: 2023-03-01

## 2023-03-06 DIAGNOSIS — I10 ESSENTIAL HYPERTENSION, BENIGN: Chronic | ICD-10-CM

## 2023-03-06 DIAGNOSIS — E78.2 MIXED HYPERLIPIDEMIA: Chronic | ICD-10-CM

## 2023-03-06 RX ORDER — PRAVASTATIN SODIUM 40 MG
TABLET ORAL
Qty: 90 TABLET | Refills: 3 | Status: SHIPPED | OUTPATIENT
Start: 2023-03-06

## 2023-03-06 RX ORDER — HYDROCHLOROTHIAZIDE 25 MG/1
TABLET ORAL
Qty: 90 TABLET | Refills: 3 | Status: SHIPPED | OUTPATIENT
Start: 2023-03-06

## 2023-03-06 NOTE — TELEPHONE ENCOUNTER
Received refill request for Pravastatin and Hydrochlorothiazide from [facility] pharmacy.     Last ov:05/17/2022 MMK    Last labs:05/17/2022 BMP & Lipid    Last Refill:12/29/2021    Next appointment:05/23/2023 MICHELLEK

## 2023-03-08 ENCOUNTER — TELEPHONE (OUTPATIENT)
Dept: PHARMACY | Age: 88
End: 2023-03-08

## 2023-03-10 ENCOUNTER — ANTI-COAG VISIT (OUTPATIENT)
Dept: PHARMACY | Age: 88
End: 2023-03-10
Payer: MEDICARE

## 2023-03-10 DIAGNOSIS — I48.91 ATRIAL FIBRILLATION, UNSPECIFIED TYPE (HCC): Primary | ICD-10-CM

## 2023-03-10 LAB — INTERNATIONAL NORMALIZATION RATIO, POC: 3.2

## 2023-03-10 PROCEDURE — 85610 PROTHROMBIN TIME: CPT

## 2023-03-10 PROCEDURE — 99211 OFF/OP EST MAY X REQ PHY/QHP: CPT

## 2023-03-10 NOTE — TELEPHONE ENCOUNTER
Warfarin prescription phoned into [facility] 24 to [facility] under Dr. Negro Forte  Warfarin 3 mg tabs  Take 3 mg (3 mg x 1) every Sun, Thu; 1.5 mg (3 mg x 0.5) all other days  90 days   2 refills    Demario Good, PharmD, [facility]

## 2023-03-10 NOTE — PROGRESS NOTES
Ms. Saji Shelton is a 80 y.o. y/o female with history of Afib   She presents today for anticoagulation monitoring and adjustment. Pertinent PMH: HTN, HLD, CAD    Patient Reported Findings:  Yes     No  [x]   []       Patient verifies current dosing regimen as listed- confirmed, follows AVS  []   [x]       S/S bleeding/bruising/swelling/SOB- denies   []   [x]       Blood in urine or stool- denies   []   [x]       Procedures scheduled in the future at this time  []   [x]       Missed Dose - Denies   []   [x]       Extra Dose- Denies   []   [x]       Change in medications- no changes  []   [x]       Change in health/diet/appetite states that she has been eating salads and green beans--> no greens with stress, will go back to eating salads 1-2 times/week---> states had greens twice weekly --> had 2 salads --> no changes, no NVD---> had less---> had a lot of salads---> had less greens     []   [x]       Change in alcohol use- doesn't drink   []   [x]       Change in activity  []   [x]       Hospital admission  []   [x]       Emergency department visit  []   [x]       Other complaints  Patient continues to be stressed with taking care of her 81 y/o brother who was just placed in a nursing home. Her younger sister has passed away in the end of December 2018 from Alzheimer's. Nephew needs a liver transplant. Clinical Outcomes:  Yes     No  []   [x]       Major bleeding event  []   [x]       Thromboembolic event    Duration of warfarin Therapy: Indefinitely   INR Range:  2.0-3.0     Concerned for patient's memory recently. Missed several apts. Doesn't drive anymore so relies on nephew. INR is 3.2 today dt less greens. Will have a salad tonight. Continue 3mg Sun and Thurs and 1.5mg all other days. Encouraged to maintain a consistency of vegetables/salads. RF called into [facility].    Recheck INR in 6 weeks on 4/18    Consent form signed 1/18/2023    Referring cardiologist is Dr. Waldo Potts.  INR (no units)   Date Value   04/15/2020 2.60 (H)   2018 2.9   2018 2.2   2018 2.3     INR,(POC) (no units)   Date Value   03/10/2023 3.2   2023 2.8   2022 3.8   10/26/2022 2.6     For Pharmacy Admin Tracking Only    Intervention Detail: Adherence Monitorin and Refill(s) Provided  Total # of Interventions Recommended: 2  Total # of Interventions Accepted: 2  Time Spent (min): 15

## 2023-04-18 ENCOUNTER — ANTI-COAG VISIT (OUTPATIENT)
Dept: PHARMACY | Age: 88
End: 2023-04-18
Payer: MEDICARE

## 2023-04-18 DIAGNOSIS — I48.91 ATRIAL FIBRILLATION, UNSPECIFIED TYPE (HCC): Primary | Chronic | ICD-10-CM

## 2023-04-18 LAB — INTERNATIONAL NORMALIZATION RATIO, POC: 2.8

## 2023-04-18 PROCEDURE — 85610 PROTHROMBIN TIME: CPT

## 2023-04-18 PROCEDURE — 99211 OFF/OP EST MAY X REQ PHY/QHP: CPT

## 2023-04-18 NOTE — PROGRESS NOTES
Ms. Samanta Lee is a 80 y.o. y/o female with history of Afib   She presents today for anticoagulation monitoring and adjustment. Pertinent PMH: HTN, HLD, CAD    Patient Reported Findings:  Yes     No  [x]   []       Patient verifies current dosing regimen as listed- confirmed, follows AVS  []   [x]       S/S bleeding/bruising/swelling/SOB- denies   []   [x]       Blood in urine or stool- denies   []   [x]       Procedures scheduled in the future at this time  []   [x]       Missed Dose - Denies   []   [x]       Extra Dose- Denies   []   [x]       Change in medications- no changes  []   [x]       Change in health/diet/appetite states that she has been eating salads and green beans--> no greens with stress, will go back to eating salads 1-2 times/week---> states had greens twice weekly --> had 2 salads --> no changes, no NVD---> had less---> had a lot of salads---> had less greens---> salad daily      []   [x]       Change in alcohol use- doesn't drink   []   [x]       Change in activity  []   [x]       Hospital admission  []   [x]       Emergency department visit  []   [x]       Other complaints  Patient continues to be stressed with taking care of her 81 y/o brother who was just placed in a nursing home. Her younger sister has passed away in the end of December 2018 from Alzheimer's. Nephew needs a liver transplant. Clinical Outcomes:  Yes     No  []   [x]       Major bleeding event  []   [x]       Thromboembolic event    Duration of warfarin Therapy: Indefinitely   INR Range:  2.0-3.0     Concerned for patient's memory recently. Missed several apts. Doesn't drive anymore so relies on nephew. INR is 2.8 today   Continue 3mg Sun and Thurs and 1.5mg all other days. Encouraged to maintain a consistency of vegetables/salads. RF called into [facility].   Recheck INR in 6 weeks on 5/23 to coordinate with other apt that day     Consent form signed 1/18/2023    Referring cardiologist is Dr. Jaelyn Fam.  INR (no units)

## 2023-05-23 ENCOUNTER — OFFICE VISIT (OUTPATIENT)
Dept: CARDIOLOGY CLINIC | Age: 88
End: 2023-05-23
Payer: MEDICARE

## 2023-05-23 ENCOUNTER — HOSPITAL ENCOUNTER (OUTPATIENT)
Age: 88
Discharge: HOME | End: 2023-05-23
Payer: MEDICARE

## 2023-05-23 ENCOUNTER — ANTI-COAG VISIT (OUTPATIENT)
Dept: PHARMACY | Age: 88
End: 2023-05-23
Payer: MEDICARE

## 2023-05-23 VITALS
BODY MASS INDEX: 22.38 KG/M2 | DIASTOLIC BLOOD PRESSURE: 68 MMHG | OXYGEN SATURATION: 95 % | HEIGHT: 59 IN | WEIGHT: 111 LBS | HEART RATE: 60 BPM | SYSTOLIC BLOOD PRESSURE: 120 MMHG

## 2023-05-23 DIAGNOSIS — I10 ESSENTIAL HYPERTENSION, BENIGN: ICD-10-CM

## 2023-05-23 DIAGNOSIS — I25.10 ATHEROSCLEROSIS OF NATIVE CORONARY ARTERY OF NATIVE HEART WITHOUT ANGINA PECTORIS: ICD-10-CM

## 2023-05-23 DIAGNOSIS — R00.1 BRADYCARDIA: ICD-10-CM

## 2023-05-23 DIAGNOSIS — E78.2 MIXED HYPERLIPIDEMIA: Primary | ICD-10-CM

## 2023-05-23 DIAGNOSIS — I48.91 ATRIAL FIBRILLATION, UNSPECIFIED TYPE (HCC): Primary | Chronic | ICD-10-CM

## 2023-05-23 DIAGNOSIS — I48.91 ATRIAL FIBRILLATION, UNSPECIFIED TYPE (HCC): ICD-10-CM

## 2023-05-23 DIAGNOSIS — E78.2 MIXED HYPERLIPIDEMIA: ICD-10-CM

## 2023-05-23 LAB
ALBUMIN SERPL-MCNC: 3.8 G/DL (ref 3.4–5)
ALP SERPL-CCNC: 108 U/L (ref 40–129)
ALT SERPL-CCNC: 10 U/L (ref 10–40)
ANION GAP SERPL CALCULATED.3IONS-SCNC: 10 MMOL/L (ref 3–16)
AST SERPL-CCNC: 19 U/L (ref 15–37)
BASOPHILS # BLD: 0 K/UL (ref 0–0.2)
BASOPHILS NFR BLD: 0.6 %
BILIRUB DIRECT SERPL-MCNC: <0.2 MG/DL (ref 0–0.3)
BILIRUB INDIRECT SERPL-MCNC: NORMAL MG/DL (ref 0–1)
BILIRUB SERPL-MCNC: 0.7 MG/DL (ref 0–1)
BUN SERPL-MCNC: 21 MG/DL (ref 7–20)
CALCIUM SERPL-MCNC: 10.2 MG/DL (ref 8.3–10.6)
CHLORIDE SERPL-SCNC: 99 MMOL/L (ref 99–110)
CHOLEST SERPL-MCNC: 204 MG/DL (ref 0–199)
CO2 SERPL-SCNC: 29 MMOL/L (ref 21–32)
CREAT SERPL-MCNC: 0.9 MG/DL (ref 0.6–1.2)
DEPRECATED RDW RBC AUTO: 15.6 % (ref 12.4–15.4)
EOSINOPHIL # BLD: 0.1 K/UL (ref 0–0.6)
EOSINOPHIL NFR BLD: 2.2 %
GFR SERPLBLD CREATININE-BSD FMLA CKD-EPI: 59 ML/MIN/{1.73_M2}
GLUCOSE SERPL-MCNC: 102 MG/DL (ref 70–99)
HCT VFR BLD AUTO: 38.6 % (ref 36–48)
HDLC SERPL-MCNC: 99 MG/DL (ref 40–60)
HGB BLD-MCNC: 13.1 G/DL (ref 12–16)
INTERNATIONAL NORMALIZATION RATIO, POC: 2.4
LDL CHOLESTEROL CALCULATED: 91 MG/DL
LYMPHOCYTES # BLD: 1.5 K/UL (ref 1–5.1)
LYMPHOCYTES NFR BLD: 26 %
MCH RBC QN AUTO: 28.4 PG (ref 26–34)
MCHC RBC AUTO-ENTMCNC: 34.1 G/DL (ref 31–36)
MCV RBC AUTO: 83.4 FL (ref 80–100)
MONOCYTES # BLD: 0.5 K/UL (ref 0–1.3)
MONOCYTES NFR BLD: 8.3 %
NEUTROPHILS # BLD: 3.6 K/UL (ref 1.7–7.7)
NEUTROPHILS NFR BLD: 62.9 %
PLATELET # BLD AUTO: 272 K/UL (ref 135–450)
PMV BLD AUTO: 8.6 FL (ref 5–10.5)
POTASSIUM SERPL-SCNC: 4.7 MMOL/L (ref 3.5–5.1)
PROT SERPL-MCNC: 7.3 G/DL (ref 6.4–8.2)
RBC # BLD AUTO: 4.62 M/UL (ref 4–5.2)
SODIUM SERPL-SCNC: 138 MMOL/L (ref 136–145)
TRIGL SERPL-MCNC: 71 MG/DL (ref 0–150)
VLDLC SERPL CALC-MCNC: 14 MG/DL
WBC # BLD AUTO: 5.7 K/UL (ref 4–11)

## 2023-05-23 PROCEDURE — 1123F ACP DISCUSS/DSCN MKR DOCD: CPT | Performed by: INTERNAL MEDICINE

## 2023-05-23 PROCEDURE — 80061 LIPID PANEL: CPT

## 2023-05-23 PROCEDURE — 99211 OFF/OP EST MAY X REQ PHY/QHP: CPT

## 2023-05-23 PROCEDURE — 85025 COMPLETE CBC W/AUTO DIFF WBC: CPT

## 2023-05-23 PROCEDURE — 99214 OFFICE O/P EST MOD 30 MIN: CPT | Performed by: INTERNAL MEDICINE

## 2023-05-23 PROCEDURE — 85610 PROTHROMBIN TIME: CPT

## 2023-05-23 PROCEDURE — 80048 BASIC METABOLIC PNL TOTAL CA: CPT

## 2023-05-23 PROCEDURE — 80076 HEPATIC FUNCTION PANEL: CPT

## 2023-05-23 PROCEDURE — 36415 COLL VENOUS BLD VENIPUNCTURE: CPT

## 2023-05-23 NOTE — PROGRESS NOTES
[address]  Cardiac Follow Up     Referring Provider:  None None     Chief Complaint   Patient presents with    1 Year Follow Up    Coronary Artery Disease    Hypertension    Hyperlipidemia        History of Present Illness:  Ms. Anthony Laura is here today for regular follow up of her AF, CAD, HTN, hyperlipidemia. She is doing well. No complains. She does not currently have a PCP    Past Medical History:   has a past medical history of Arthritis, CAD (coronary artery disease), Carpal tunnel syndrome, Cataract, Gastric ulcer, GERD (gastroesophageal reflux disease), Hyperlipidemia, Hypertension, and Peptic ulcer. Surgical History:   has a past surgical history that includes Hysterectomy; Cataract removal; and laminectomy. Social History:   reports that she quit smoking about 38 years ago. Her smoking use included cigarettes. She has never used smokeless tobacco. She reports current alcohol use. She reports that she does not use drugs. Family History:  family history includes Other in her brother and sister; Stroke in her father. Home Medications:  Outpatient Encounter Medications as of 5/23/2023   Medication Sig Dispense Refill    hydroCHLOROthiazide (HYDRODIURIL) 25 MG tablet TAKE 1 TABLET DAILY 90 tablet 3    pravastatin (PRAVACHOL) 40 MG tablet TAKE 1 TABLET DAILY 90 tablet 3    enalapril (VASOTEC) 5 MG tablet TAKE 1 TABLET TWICE A  tablet 3    amLODIPine (NORVASC) 5 MG tablet TAKE 1 TABLET DAILY 90 tablet 3    pantoprazole (PROTONIX) 40 MG tablet Take 1 tablet by mouth daily      acetaminophen (TYLENOL) 650 MG extended release tablet Take 1 tablet by mouth every 8 hours as needed for Pain      warfarin (COUMADIN) 3 MG tablet Take 1 tablet by mouth See Admin Instructions Dose adjusted by [facility]       No facility-administered encounter medications on file as of 5/23/2023. Allergies:  Patient has no known allergies. [x] Medications and dosages reviewed.   ROS:  [x]Full

## 2023-05-23 NOTE — PROGRESS NOTES
Ms. Monae Smith is a 80 y.o. y/o female with history of Afib   She presents today for anticoagulation monitoring and adjustment. Pertinent PMH: HTN, HLD, CAD    Patient Reported Findings:  Yes     No  [x]   []       Patient verifies current dosing regimen as listed- confirmed, follows AVS  []   [x]       S/S bleeding/bruising/swelling/SOB- denies   []   [x]       Blood in urine or stool- denies   []   [x]       Procedures scheduled in the future at this time  []   [x]       Missed Dose - Denies   []   [x]       Extra Dose- Denies   []   [x]       Change in medications- no changes  []   [x]       Change in health/diet/appetite states that she has been eating salads and green beans--> no greens with stress, will go back to eating salads 1-2 times/week---> states had greens twice weekly --> had 2 salads --> no changes, no NVD---> had less---> had a lot of salads---> had less greens---> salad daily      []   [x]       Change in alcohol use- doesn't drink   []   [x]       Change in activity  []   [x]       Hospital admission  []   [x]       Emergency department visit  []   [x]       Other complaints  Patient continues to be stressed with taking care of her 79 y/o brother who was just placed in a nursing home. Her younger sister has passed away in the end of December 2018 from Alzheimer's. Nephew needs a liver transplant. Clinical Outcomes:  Yes     No  []   [x]       Major bleeding event  []   [x]       Thromboembolic event    Duration of warfarin Therapy: Indefinitely   INR Range:  2.0-3.0     Concerned for patient's memory recently. Missed several apts. Doesn't drive anymore so relies on nephew. INR is 2.4 today   Continue 3mg Sun and Thurs and 1.5mg all other days. Encouraged to maintain a consistency of vegetables/salads.    Recheck INR in 6 weeks on 7/5    Consent form signed 1/18/2023    Referring cardiologist is Dr. Rebekah Johnson.  INR (no units)   Date Value   04/15/2020 2.60 (H)   09/11/2018 2.9

## 2023-05-24 ENCOUNTER — TELEPHONE (OUTPATIENT)
Dept: CARDIOLOGY CLINIC | Age: 88
End: 2023-05-24

## 2023-05-26 NOTE — TELEPHONE ENCOUNTER
----- Message from Kayley Bradshaw MD sent at 5/24/2023  2:52 PM EDT -----  Cholesterol higher.  Be sure she is taking her statin    [facility]
Left message on VM for patient to call office back to discuss.
Patient called back. She has not been taking the Pravachol and will restart it. She has just mistakenly been not taking it. She does have bottle of it at home.
Patient wants to schedule her yearly appt. Advised her that we will call her when we get the schedule for next year. If she has an issue before then and needs seen she should call us. She verbalized understanding.
Pt called to speak w/Светлана kaur: her appt. Please advise.   Thank you
[phone]

## 2023-06-01 ENCOUNTER — TELEPHONE (OUTPATIENT)
Dept: PHARMACY | Age: 88
End: 2023-06-01

## 2023-06-01 NOTE — TELEPHONE ENCOUNTER
Patient called twice asking for Catarino Barros and wondering when she will get a call to get scheduled for her follow up in 1 year with cardio. Explained we are the coumadin clinic and they will call her to schedule once their schedule is out that far, currently not booking that far out yet per notes. Attempted to call nephew as concerned for patient's confusion. No answer.     Sherley Arreola, PharmD, [address]    Total # of Interventions Recommended: 0  Total # of Interventions Accepted: 0  Time Spent (min): 15

## 2023-07-05 ENCOUNTER — ANTI-COAG VISIT (OUTPATIENT)
Dept: PHARMACY | Age: 88
End: 2023-07-05
Payer: MEDICARE

## 2023-07-05 DIAGNOSIS — I48.91 ATRIAL FIBRILLATION, UNSPECIFIED TYPE (HCC): Primary | Chronic | ICD-10-CM

## 2023-07-05 LAB — INTERNATIONAL NORMALIZATION RATIO, POC: 2.5

## 2023-07-05 PROCEDURE — 99211 OFF/OP EST MAY X REQ PHY/QHP: CPT

## 2023-07-05 PROCEDURE — 85610 PROTHROMBIN TIME: CPT

## 2023-07-05 NOTE — PROGRESS NOTES
Ms. Darlene Back is a 80 y.o. y/o female with history of Afib   She presents today for anticoagulation monitoring and adjustment. Pertinent PMH: HTN, HLD, CAD    Patient Reported Findings:  Yes     No  [x]   []       Patient verifies current dosing regimen as listed- confirmed, follows AVS  []   [x]       S/S bleeding/bruising/swelling/SOB- denies   []   [x]       Blood in urine or stool- denies   []   [x]       Procedures scheduled in the future at this time  []   [x]       Missed Dose - Denies   []   [x]       Extra Dose- Denies   []   [x]       Change in medications- no changes  []   [x]       Change in health/diet/appetite states that she has been eating salads and green beans--> no greens with stress, will go back to eating salads 1-2 times/week---> states had greens twice weekly --> had 2 salads --> no changes, no NVD---> had less---> had a lot of salads---> had less greens---> salad daily---> salad 3-4 times/week, sick of salads       []   [x]       Change in alcohol use- doesn't drink   []   [x]       Change in activity  []   [x]       Hospital admission  []   [x]       Emergency department visit  []   [x]       Other complaints  Patient continues to be stressed with taking care of her 81 y/o brother who was just placed in a nursing home. Her younger sister has passed away in the end of December 2018 from Alzheimer's. Nephew needs a liver transplant. Clinical Outcomes:  Yes     No  []   [x]       Major bleeding event  []   [x]       Thromboembolic event    Duration of warfarin Therapy: Indefinitely   INR Range:  2.0-3.0     Concerned for patient's memory recently. Missed several apts. Doesn't drive anymore so relies on nephew. INR is 2.5 today   Continue 3mg Sun and Thurs and 1.5mg all other days. Encouraged to maintain a consistency of vegetables/salads.    Recheck INR in 6 weeks on 8/16    Consent form signed 1/18/2023    Referring cardiologist is Dr. Rodolfo Mckeon.  INR (no units)   Date Value

## 2023-07-06 ENCOUNTER — TELEPHONE (OUTPATIENT)
Dept: CARDIOLOGY CLINIC | Age: 88
End: 2023-07-06

## 2023-07-06 NOTE — TELEPHONE ENCOUNTER
Attempted to contact pt to obtain more information but there was no answer. Left message to inform pt that Diego Castaneda is not in today. Spontaneous, unlabored and symmetrical

## 2023-07-07 NOTE — TELEPHONE ENCOUNTER
Pt called back to speak with Princess Farr. States she needs to speak with Zenaida Farr. Could not obtain any more information from pt. She was concerned about having an appointment.

## 2023-08-04 ENCOUNTER — TELEPHONE (OUTPATIENT)
Dept: CARDIOLOGY CLINIC | Age: 88
End: 2023-08-04

## 2023-08-04 NOTE — TELEPHONE ENCOUNTER
Pt needs oral sx, Dr. Danae Reyes would like to know pt's general state of health before proceeding with creating a plan of treatment. Please call to discuss.

## 2023-08-07 NOTE — TELEPHONE ENCOUNTER
Called Dr Nathan Harrison office. Dr Carmen Gillespie states patient has broken teeth and poor gum health. She needs dentures. He was trying to decide between extraction of 7 teeth or cutting teeth off at gumline which would be less invasive and would not require her to hold Coumadin. He has decided to do less invasive work due to her age and frailty.  Does not need anything from [facility].

## 2023-08-16 ENCOUNTER — ANTI-COAG VISIT (OUTPATIENT)
Dept: PHARMACY | Age: 88
End: 2023-08-16
Payer: MEDICARE

## 2023-08-16 DIAGNOSIS — I48.91 ATRIAL FIBRILLATION, UNSPECIFIED TYPE (HCC): Primary | Chronic | ICD-10-CM

## 2023-08-16 LAB — INTERNATIONAL NORMALIZATION RATIO, POC: 2.8

## 2023-08-16 PROCEDURE — 99211 OFF/OP EST MAY X REQ PHY/QHP: CPT

## 2023-08-16 PROCEDURE — 85610 PROTHROMBIN TIME: CPT

## 2023-08-16 NOTE — PROGRESS NOTES
Ms. Valeriy Rubio is a 80 y.o. y/o female with history of Afib   She presents today for anticoagulation monitoring and adjustment. Pertinent PMH: HTN, HLD, CAD    Patient Reported Findings:  Yes     No  [x]   []       Patient verifies current dosing regimen as listed- confirmed, follows AVS  []   [x]       S/S bleeding/bruising/swelling/SOB- denies   []   [x]       Blood in urine or stool- denies   []   [x]       Procedures scheduled in the future at this time  []   [x]       Missed Dose - Denies   []   [x]       Extra Dose- Denies   []   [x]       Change in medications- no changes  []   [x]       Change in health/diet/appetite states that she has been eating salads and green beans--> no greens with stress, will go back to eating salads 1-2 times/week---> states had greens twice weekly --> had 2 salads ---> had less---> had a lot of salads---> had less greens---> salad daily---> salad 3-4 times/week, sick of salads --> no changes, no NVD      []   [x]       Change in alcohol use- doesn't drink   []   [x]       Change in activity  []   [x]       Hospital admission  []   [x]       Emergency department visit  []   [x]       Other complaints  Patient continues to be stressed with taking care of her 79 y/o brother who was just placed in a nursing home. Her younger sister has passed away in the end of December 2018 from Alzheimer's. Nephew needs a liver transplant. Clinical Outcomes:  Yes     No  []   [x]       Major bleeding event  []   [x]       Thromboembolic event    Duration of warfarin Therapy: Indefinitely   INR Range:  2.0-3.0     Concerned for patient's memory recently. Missed several apts. Doesn't drive anymore so relies on nephew. INR is 2.8 today   Continue 3mg Sun and Thurs and 1.5mg all other days. Encouraged to maintain a consistency of vegetables/salads.    Refilled warfarin at opp   Recheck INR in 6 weeks on 9/27    Consent form signed 1/18/2023    Referring cardiologist is Dr. Monika Alanis.  INR

## 2023-09-27 ENCOUNTER — ANTI-COAG VISIT (OUTPATIENT)
Dept: PHARMACY | Age: 88
End: 2023-09-27
Payer: MEDICARE

## 2023-09-27 DIAGNOSIS — I48.91 ATRIAL FIBRILLATION, UNSPECIFIED TYPE (HCC): Primary | Chronic | ICD-10-CM

## 2023-09-27 LAB — INTERNATIONAL NORMALIZATION RATIO, POC: 2.8

## 2023-09-27 PROCEDURE — 85610 PROTHROMBIN TIME: CPT

## 2023-09-27 PROCEDURE — 99211 OFF/OP EST MAY X REQ PHY/QHP: CPT

## 2023-09-27 NOTE — PROGRESS NOTES
Ms. Nicolasa Brower is a 80 y.o. y/o female with history of Afib   She presents today for anticoagulation monitoring and adjustment. Pertinent PMH: HTN, HLD, CAD    Patient Reported Findings:  Yes     No  [x]   []       Patient verifies current dosing regimen as listed- confirmed, follows AVS  []   [x]       S/S bleeding/bruising/swelling/SOB- denies   []   [x]       Blood in urine or stool- denies   []   [x]       Procedures scheduled in the future at this time  []   [x]       Missed Dose - Denies   []   [x]       Extra Dose- Denies   []   [x]       Change in medications- no changes  []   [x]       Change in health/diet/appetite states that she has been eating salads and green beans--> no greens with stress, will go back to eating salads 1-2 times/week---> states had greens twice weekly --> had 2 salads ---> had less---> had a lot of salads---> had less greens---> salad daily---> salad 3-4 times/week, sick of salads --> no changes, no NVD      []   [x]       Change in alcohol use- doesn't drink   []   [x]       Change in activity  []   [x]       Hospital admission  []   [x]       Emergency department visit  []   [x]       Other complaints  Patient continues to be stressed with taking care of her 81 y/o brother who was just placed in a nursing home. Her younger sister has passed away in the end of December 2018 from Alzheimer's. Nephew needs a liver transplant. Clinical Outcomes:  Yes     No  []   [x]       Major bleeding event  []   [x]       Thromboembolic event    Duration of warfarin Therapy: Indefinitely   INR Range:  2.0-3.0     Concerned for patient's memory recently. Missed several apts. Doesn't drive anymore so relies on nephew. INR is 2.8 again today   Continue 3mg Sun and Thurs and 1.5mg all other days. Encouraged to maintain a consistency of vegetables/salads.    Recheck INR in 6 weeks on 11/8- HBD 11/12    Consent form signed 1/18/2023    Referring cardiologist is Dr. Brenda Bradley.  INR (no units)

## 2023-11-01 ENCOUNTER — TELEPHONE (OUTPATIENT)
Dept: CARDIOLOGY CLINIC | Age: 88
End: 2023-11-01

## 2023-11-01 NOTE — TELEPHONE ENCOUNTER
Tiffanie Massiel, son is wanting to request a referral for a  PCP for the Pt. Please advise.   Thank you

## 2023-11-08 ENCOUNTER — TELEPHONE (OUTPATIENT)
Dept: PHARMACY | Age: 88
End: 2023-11-08

## 2023-11-08 ENCOUNTER — TELEPHONE (OUTPATIENT)
Dept: CARDIOLOGY CLINIC | Age: 88
End: 2023-11-08

## 2023-11-08 ENCOUNTER — ANTI-COAG VISIT (OUTPATIENT)
Dept: PHARMACY | Age: 88
End: 2023-11-08
Payer: MEDICARE

## 2023-11-08 DIAGNOSIS — I48.91 ATRIAL FIBRILLATION, UNSPECIFIED TYPE (HCC): Primary | Chronic | ICD-10-CM

## 2023-11-08 LAB — INTERNATIONAL NORMALIZATION RATIO, POC: 6.4

## 2023-11-08 PROCEDURE — 99212 OFFICE O/P EST SF 10 MIN: CPT

## 2023-11-08 PROCEDURE — 85610 PROTHROMBIN TIME: CPT

## 2023-11-08 NOTE — TELEPHONE ENCOUNTER
Tried calling patient's nephew Mariama Roman. Left message for him to call me back. Spoke to Shelton at the clinic. Will update her on the plan once discussed with nephew.

## 2023-11-08 NOTE — TELEPHONE ENCOUNTER
Called referring to discuss recent concerns regarding patient and INR from today.     Theresa Weller, PharmD, [address]    Total # of Interventions Recommended: 0  Total # of Interventions Accepted: 0  Time Spent (min): 15

## 2023-11-08 NOTE — TELEPHONE ENCOUNTER
David Romero is concerned with pt's ability to be compliant with her coumadin. Her INR today was 6.4. David Romero is questioning wether pt should continue therapy. Please call to discuss.

## 2023-11-08 NOTE — TELEPHONE ENCOUNTER
Clari Reid called back to discuss, explained situation and have noticed similar concerns. Cardio will discuss with nephew and determine continuation, switching to Eliquis, or other options etc. Will call us.     Sierra Mccracken, PharmD, [address]    Total # of Interventions Recommended: 0  Total # of Interventions Accepted: 0  Time Spent (min): 15

## 2023-11-09 NOTE — TELEPHONE ENCOUNTER
Spoke with patient's nephew Kaye Chang. Him and his brother Felix Skelton have noticed a decline in patient's cognitive status recently. Patient lives alone and refuses to go to an assisted living facility. One of her nephews are there daily to check on her. They are taking over managing her medications for her. She will not have access to any meds. Reviewed all meds and what they are for with nephew Kaye Chang. Discussed Eliquis vs Coumadin. He would like to keep her on the Coumadin for now. He is concerned about her being alone and possibly falling. He is going to talk with new PCP about life alert. Discussed that if she starts falling we would need to know. Discussed risks of falling on blood thinner  (bleed) and risks of coming off blood thinner. (Stroke)    Notified Adria at the Coumadin clinic.

## 2023-11-09 NOTE — TELEPHONE ENCOUNTER
[facility] called back from cardio after talking to patient's nephew, Laurita Conroy. Discussed concerns and options with them. They will continue to monitor and if patient starts to have increased confusion/falls they will discuss discontinuing blood thinner. Nephews will now have absolute control of meds and will go over daily to give meds (she will not keep at home anymore).     Abran Michael, PharmD, [address]    Total # of Interventions Recommended: 0  Total # of Interventions Accepted: 0  Time Spent (min): 15

## 2023-11-13 NOTE — PROGRESS NOTES
Ms. Nicolasa Brower is a 80 y.o. y/o female with history of Afib   She presents today for anticoagulation monitoring and adjustment. Pertinent PMH: HTN, HLD, CAD    Patient Reported Findings:  Yes     No  [x]   []       Patient verifies current dosing regimen as listed- confirmed, follows AVS  []   [x]       S/S bleeding/bruising/swelling/SOB- denies   []   [x]       Blood in urine or stool- denies   []   [x]       Procedures scheduled in the future at this time  []   [x]       Missed Dose - Denies   []   [x]       Extra Dose- Denies   []   [x]       Change in medications- no changes  []   [x]       Change in health/diet/appetite states that she has been eating salads and green beans--> no greens with stress, will go back to eating salads 1-2 times/week---> states had greens twice weekly --> had 2 salads ---> had less---> had a lot of salads---> had less greens---> salad daily---> salad 3-4 times/week, sick of salads --> no changes, no NVD      []   [x]       Change in alcohol use- doesn't drink   []   [x]       Change in activity  []   [x]       Hospital admission  []   [x]       Emergency department visit  []   [x]       Other complaints  Patient continues to be stressed with taking care of her 81 y/o brother who was just placed in a nursing home. Her younger sister has passed away in the end of December 2018 from Alzheimer's. Nephew needs a liver transplant. Clinical Outcomes:  Yes     No  []   [x]       Major bleeding event  []   [x]       Thromboembolic event    Duration of warfarin Therapy: Indefinitely   INR Range:  2.0-3.0     Concerned for patient's memory recently. Missed several apts. Doesn't drive anymore so relies on nephew. INR is 6.4 today after doubled doses, gave pillbox, nephew to help with meds now. Thinks she took it today. Hold for 2 days then continue 3mg Sun and Thurs and 1.5mg all other days. Encouraged to maintain a consistency of vegetables/salads.    Recheck INR in 1 week, No

## 2023-11-15 ENCOUNTER — TELEPHONE (OUTPATIENT)
Dept: CARDIOLOGY CLINIC | Age: 88
End: 2023-11-15

## 2023-11-15 ENCOUNTER — TELEPHONE (OUTPATIENT)
Dept: PHARMACY | Age: 88
End: 2023-11-15

## 2023-11-15 NOTE — TELEPHONE ENCOUNTER
Pt needs letter on [facility] letter head. It needs to state the below: Stanley Champion is unable to travel to [address] departing Wednesday Nov 15, 2023 returning on Sunday Nov 19, 2023. He is the caregiver & P.O.A. for his aunt Anjel Hernandez. \"     She passed away on 11/14 so he needs this to get refund for cancellation penalty for trip. Please advise when complete [phone]. Thank you.

## 2023-11-16 ENCOUNTER — APPOINTMENT (OUTPATIENT)
Dept: PHARMACY | Age: 88
End: 2023-11-16
Payer: MEDICARE

## 2023-11-16 NOTE — TELEPHONE ENCOUNTER
Spoke with Galena patients nephew he stated they would  letter tomorrow at [facility]. I will place letter at  for him to  at his convince. Call complete.

## 2023-11-16 NOTE — TELEPHONE ENCOUNTER
Letter created and signed. Faxing to [facility] office. Please call patient's nephew and see if he wants to pick letter up at office please.

## 2023-11-20 ENCOUNTER — TELEPHONE (OUTPATIENT)
Dept: CARDIOLOGY CLINIC | Age: 88
End: 2023-11-20

## 2023-11-20 NOTE — TELEPHONE ENCOUNTER
Martha Gustafson is asking if [facility] is on [employer] to sign the death certificate, or if she needs to fax for signature. Please call to advise.

## 2023-11-22 NOTE — TELEPHONE ENCOUNTER
Breanna Hui with the [facility] office is asking for a call back regarding signing pt's death certificate.  Please call back at [phone]

## 2023-11-29 ENCOUNTER — TELEPHONE (OUTPATIENT)
Dept: CARDIOLOGY CLINIC | Age: 88
End: 2023-11-29

## 2023-11-29 NOTE — TELEPHONE ENCOUNTER
Dolly Manzano called to speak with mmk are someone in the office re: signing the Pt death certificate. Please advise.   Thank you

## 2023-11-30 NOTE — TELEPHONE ENCOUNTER
Vimal Parish from [facility] has agreed to sign death certificate and asking if he still has original copy or needs another one dropped off.  Please call Vimal Parish at [phone]

## 2024-02-19 DIAGNOSIS — I10 ESSENTIAL HYPERTENSION, BENIGN: Chronic | ICD-10-CM

## 2024-02-19 RX ORDER — AMLODIPINE BESYLATE 5 MG/1
TABLET ORAL
Qty: 90 TABLET | Refills: 3 | OUTPATIENT
Start: 2024-02-19

## 2024-02-19 RX ORDER — ENALAPRIL MALEATE 5 MG/1
5 TABLET ORAL 2 TIMES DAILY
Qty: 180 TABLET | Refills: 3 | OUTPATIENT
Start: 2024-02-19